# Patient Record
Sex: MALE | Race: WHITE | NOT HISPANIC OR LATINO | Employment: OTHER | URBAN - METROPOLITAN AREA
[De-identification: names, ages, dates, MRNs, and addresses within clinical notes are randomized per-mention and may not be internally consistent; named-entity substitution may affect disease eponyms.]

---

## 2017-06-26 ENCOUNTER — LAB (OUTPATIENT)
Dept: LAB | Facility: HOSPITAL | Age: 72
End: 2017-06-26
Attending: ORTHOPAEDIC SURGERY

## 2017-06-26 ENCOUNTER — TRANSCRIBE ORDERS (OUTPATIENT)
Dept: LAB | Facility: HOSPITAL | Age: 72
End: 2017-06-26

## 2017-06-26 ENCOUNTER — HOSPITAL ENCOUNTER (OUTPATIENT)
Dept: CARDIOLOGY | Facility: HOSPITAL | Age: 72
Discharge: HOME | End: 2017-06-26
Attending: ORTHOPAEDIC SURGERY | Admitting: ORTHOPAEDIC SURGERY

## 2017-06-26 DIAGNOSIS — M66.871 TIBIALIS POSTERIOR TENDON TEAR, NONTRAUMATIC, RIGHT: ICD-10-CM

## 2017-06-26 DIAGNOSIS — M66.871 TIBIALIS POSTERIOR TENDON TEAR, NONTRAUMATIC, RIGHT: Primary | ICD-10-CM

## 2017-06-26 LAB
ALBUMIN SERPL-MCNC: 4.4 G/DL (ref 3.5–5.2)
ALBUMIN/GLOB SERPL: 1.5 G/DL
ALP SERPL-CCNC: 35 U/L (ref 39–117)
ALT SERPL W P-5'-P-CCNC: 26 U/L (ref 1–41)
ANION GAP SERPL CALCULATED.3IONS-SCNC: 13.9 MMOL/L
AST SERPL-CCNC: 21 U/L (ref 1–40)
BASOPHILS # BLD AUTO: 0.02 10*3/MM3 (ref 0–0.2)
BASOPHILS NFR BLD AUTO: 0.2 % (ref 0–1.5)
BILIRUB SERPL-MCNC: 0.3 MG/DL (ref 0.1–1.2)
BUN BLD-MCNC: 37 MG/DL (ref 8–23)
BUN/CREAT SERPL: 21 (ref 7–25)
CALCIUM SPEC-SCNC: 9.8 MG/DL (ref 8.6–10.5)
CHLORIDE SERPL-SCNC: 104 MMOL/L (ref 98–107)
CO2 SERPL-SCNC: 23.1 MMOL/L (ref 22–29)
CREAT BLD-MCNC: 1.76 MG/DL (ref 0.76–1.27)
DEPRECATED RDW RBC AUTO: 45.2 FL (ref 37–54)
EOSINOPHIL # BLD AUTO: 0.27 10*3/MM3 (ref 0–0.7)
EOSINOPHIL NFR BLD AUTO: 3.2 % (ref 0.3–6.2)
ERYTHROCYTE [DISTWIDTH] IN BLOOD BY AUTOMATED COUNT: 13.2 % (ref 11.5–14.5)
GFR SERPL CREATININE-BSD FRML MDRD: 38 ML/MIN/1.73
GLOBULIN UR ELPH-MCNC: 2.9 GM/DL
GLUCOSE BLD-MCNC: 119 MG/DL (ref 65–99)
HCT VFR BLD AUTO: 45.6 % (ref 40.4–52.2)
HGB BLD-MCNC: 15.5 G/DL (ref 13.7–17.6)
IMM GRANULOCYTES # BLD: 0 10*3/MM3 (ref 0–0.03)
IMM GRANULOCYTES NFR BLD: 0 % (ref 0–0.5)
LYMPHOCYTES # BLD AUTO: 2.59 10*3/MM3 (ref 0.9–4.8)
LYMPHOCYTES NFR BLD AUTO: 31.1 % (ref 19.6–45.3)
MCH RBC QN AUTO: 31.8 PG (ref 27–32.7)
MCHC RBC AUTO-ENTMCNC: 34 G/DL (ref 32.6–36.4)
MCV RBC AUTO: 93.6 FL (ref 79.8–96.2)
MONOCYTES # BLD AUTO: 0.69 10*3/MM3 (ref 0.2–1.2)
MONOCYTES NFR BLD AUTO: 8.3 % (ref 5–12)
NEUTROPHILS # BLD AUTO: 4.77 10*3/MM3 (ref 1.9–8.1)
NEUTROPHILS NFR BLD AUTO: 57.2 % (ref 42.7–76)
PLATELET # BLD AUTO: 164 10*3/MM3 (ref 140–500)
PMV BLD AUTO: 11.7 FL (ref 6–12)
POTASSIUM BLD-SCNC: 4.5 MMOL/L (ref 3.5–5.2)
PROT SERPL-MCNC: 7.3 G/DL (ref 6–8.5)
RBC # BLD AUTO: 4.87 10*6/MM3 (ref 4.6–6)
SODIUM BLD-SCNC: 141 MMOL/L (ref 136–145)
WBC NRBC COR # BLD: 8.34 10*3/MM3 (ref 4.5–10.7)

## 2017-06-26 PROCEDURE — 36415 COLL VENOUS BLD VENIPUNCTURE: CPT

## 2017-06-26 PROCEDURE — 85025 COMPLETE CBC W/AUTO DIFF WBC: CPT

## 2017-06-26 PROCEDURE — 93010 ELECTROCARDIOGRAM REPORT: CPT | Performed by: INTERNAL MEDICINE

## 2017-06-26 PROCEDURE — 93005 ELECTROCARDIOGRAM TRACING: CPT | Performed by: ORTHOPAEDIC SURGERY

## 2017-06-26 PROCEDURE — 80053 COMPREHEN METABOLIC PANEL: CPT

## 2017-10-26 ENCOUNTER — APPOINTMENT (OUTPATIENT)
Dept: GENERAL RADIOLOGY | Facility: HOSPITAL | Age: 72
End: 2017-10-26

## 2017-10-26 ENCOUNTER — HOSPITAL ENCOUNTER (EMERGENCY)
Facility: HOSPITAL | Age: 72
Discharge: HOME | End: 2017-10-26
Attending: EMERGENCY MEDICINE | Admitting: EMERGENCY MEDICINE

## 2017-10-26 VITALS
HEART RATE: 72 BPM | HEIGHT: 73 IN | SYSTOLIC BLOOD PRESSURE: 119 MMHG | TEMPERATURE: 98 F | DIASTOLIC BLOOD PRESSURE: 88 MMHG | BODY MASS INDEX: 25.84 KG/M2 | WEIGHT: 195 LBS | OXYGEN SATURATION: 98 % | RESPIRATION RATE: 17 BRPM

## 2017-10-26 DIAGNOSIS — M54.30 ACUTE SCIATICA: Primary | ICD-10-CM

## 2017-10-26 LAB — GLUCOSE BLDC GLUCOMTR-MCNC: 163 MG/DL (ref 70–130)

## 2017-10-26 PROCEDURE — 82962 GLUCOSE BLOOD TEST: CPT

## 2017-10-26 PROCEDURE — 99283 EMERGENCY DEPT VISIT LOW MDM: CPT

## 2017-10-26 PROCEDURE — 72110 X-RAY EXAM L-2 SPINE 4/>VWS: CPT

## 2017-10-26 PROCEDURE — 73502 X-RAY EXAM HIP UNI 2-3 VIEWS: CPT

## 2017-10-26 RX ORDER — IBUPROFEN 600 MG/1
600 TABLET ORAL EVERY 8 HOURS PRN
Qty: 30 TABLET | Refills: 0 | Status: SHIPPED | OUTPATIENT
Start: 2017-10-26 | End: 2018-05-09

## 2017-10-26 RX ORDER — TRAMADOL HYDROCHLORIDE 50 MG/1
50 TABLET ORAL EVERY 6 HOURS PRN
Qty: 15 TABLET | Refills: 0 | Status: SHIPPED | OUTPATIENT
Start: 2017-10-26 | End: 2018-05-09

## 2017-10-26 RX ORDER — ROSUVASTATIN CALCIUM 5 MG/1
5 TABLET, COATED ORAL DAILY
COMMUNITY

## 2017-10-26 RX ORDER — CYCLOBENZAPRINE HCL 10 MG
10 TABLET ORAL 3 TIMES DAILY PRN
Qty: 21 TABLET | Refills: 0 | Status: SHIPPED | OUTPATIENT
Start: 2017-10-26 | End: 2018-05-09

## 2017-10-26 RX ORDER — LISINOPRIL 20 MG/1
20 TABLET ORAL DAILY
COMMUNITY
End: 2022-08-02

## 2017-10-26 RX ORDER — GLIPIZIDE 10 MG/1
10 TABLET ORAL
COMMUNITY

## 2017-10-26 NOTE — ED PROVIDER NOTES
" EMERGENCY DEPARTMENT ENCOUNTER    CHIEF COMPLAINT  Chief Complaint: LLE pain  History given by: Pt  History limited by: Nothing  Room Number: 32/32  PMD: Maria Luz Mcnally MD      HPI:  Pt is a 72 y.o. male who presents complaining of L leg pain, onset 8 days ago. He reports his pain starts in his L hip pain that radiates into the front of his L knee. He describes the pain as an \"ache.\" Pt states he was working around the house, and then sneezing when he felt a shooting pain from his back into his L leg. He states his pain is worse with walking, but is improved when bending over. He denies fever, chills, dysuria, frequency, numbness or tingling in his L leg. He reports a hx of back pain, diabetes, and high cholesterol but denies a hx of his current sx as well as smoking.     Duration:  8 days  Onset: sudden  Timing: constant  Location: LLE  Radiation: L hip into his L knee  Quality: \"ache\"  Intensity/Severity: moderate  Progression: unchanged  Associated Symptoms: none stated  Aggravating Factors: movement  Alleviating Factors: bending over  Previous Episodes: Pt reports a hx of back pain, but denies a hx of this type of pain  Treatment before arrival: None stated    PAST MEDICAL HISTORY  Active Ambulatory Problems     Diagnosis Date Noted   • No Active Ambulatory Problems     Resolved Ambulatory Problems     Diagnosis Date Noted   • No Resolved Ambulatory Problems     Past Medical History:   Diagnosis Date   • Cancer    • Diabetes mellitus    • Hyperlipidemia    • Hypertension        PAST SURGICAL HISTORY  Past Surgical History:   Procedure Laterality Date   • BLADDER SURGERY     • FOOT SURGERY      RIGHT FOOT       FAMILY HISTORY  History reviewed. No pertinent family history.    SOCIAL HISTORY  Social History     Social History   • Marital status:      Spouse name: N/A   • Number of children: N/A   • Years of education: N/A     Occupational History   • Not on file.     Social History Main Topics   • " Smoking status: Never Smoker   • Smokeless tobacco: Not on file   • Alcohol use No   • Drug use: No   • Sexual activity: Not on file     Other Topics Concern   • Not on file     Social History Narrative   • No narrative on file       ALLERGIES  Review of patient's allergies indicates no known allergies.    REVIEW OF SYSTEMS  Review of Systems   Constitutional: Negative for activity change, appetite change, chills and fever.   HENT: Negative for congestion and sore throat.    Eyes: Negative.    Respiratory: Negative for cough and shortness of breath.    Cardiovascular: Negative for chest pain and leg swelling.   Gastrointestinal: Negative for abdominal pain, diarrhea and vomiting.   Endocrine: Negative.    Genitourinary: Negative for decreased urine volume and dysuria.   Musculoskeletal: Positive for arthralgias (L hip into L knee). Negative for back pain and neck pain.   Skin: Negative for rash and wound.   Allergic/Immunologic: Negative.    Neurological: Negative for weakness, numbness and headaches.   Hematological: Negative.    Psychiatric/Behavioral: Negative.    All other systems reviewed and are negative.      PHYSICAL EXAM  ED Triage Vitals   Temp Heart Rate Resp BP SpO2   10/26/17 1048 10/26/17 1046 10/26/17 1046 10/26/17 1103 10/26/17 1046   97.8 °F (36.6 °C) 113 20 110/89 93 %      Temp src Heart Rate Source Patient Position BP Location FiO2 (%)   10/26/17 1048 -- -- -- --   Tympanic           Physical Exam   Constitutional: He is oriented to person, place, and time and well-developed, well-nourished, and in no distress.   HENT:   Head: Normocephalic and atraumatic.   Eyes: EOM are normal. Pupils are equal, round, and reactive to light.   Neck: Normal range of motion. Neck supple.   Cardiovascular: Normal rate, regular rhythm and normal heart sounds.    No murmur heard.  Pulmonary/Chest: Effort normal and breath sounds normal. No respiratory distress.   Abdominal: Soft. Bowel sounds are normal. There is no  tenderness. There is no rebound and no guarding.   Musculoskeletal: Normal range of motion. He exhibits no edema.        Left hip: He exhibits tenderness (lateral). He exhibits normal range of motion.        Left knee: He exhibits normal range of motion.        Thoracic back: He exhibits no tenderness.        Lumbar back: He exhibits no tenderness.        Left upper leg: He exhibits tenderness (anterior L thigh).   Neurological: He is alert and oriented to person, place, and time. He has normal sensation and normal strength. He has a normal Straight Leg Raise Test.   2+ patellar reflexes bilaterally  Neurovascularly intact   Skin: Skin is warm and dry.   Psychiatric: Mood and affect normal.   Nursing note and vitals reviewed.      LAB RESULTS  Lab Results (last 24 hours)     Procedure Component Value Units Date/Time    POC Glucose Fingerstick [[id number]]  (Abnormal) Collected:  10/26/17 1146    Specimen:  Blood Updated:  10/26/17 1147     Glucose 163 (H) mg/dL     Narrative:       Meter: [id number] : [id number] Luis Alfredo Tyler          I ordered the above labs and reviewed the results    RADIOLOGY  XR Hip With or Without Pelvis 2 - 3 View Left      There are moderate degenerative changes involving the right hip more  than left with joint space narrowing and subchondral degenerative cystic  change in the head of the right femur. There are minimal degenerative  changes at both sacroiliac joints.   XR Spine Lumbar 4+ View      There is severe disc space narrowing and spurring at L5-S1 and to a  lesser extent at L4-5. There is also moderate spurring of the posterior  facets in the mid and lower lumbar spine.        I ordered the above noted radiological studies. Interpreted by radiologist. Reviewed by me in PACS.       PROCEDURES  Procedures      PROGRESS AND CONSULTS  ED Course     1138 - XR L Hip, lumbar spine, and glucose level ordered for further evaluation.     1319 - Rechecked pt. Pt is resting comfortably.  Informed pt of his XRs which are negative acute and show degenerative changes. D/w pt the plan to discharge home with muscle relaxers and naproxen for a follow up with PCP. Instructed pt to RTER the for fever and incontinence. Pt understands and agrees with plan.      MEDICAL DECISION MAKING  Results were reviewed/discussed with the patient and they were also made aware of online access. Pt also made aware that some labs, such as cultures, will not be resulted during ER visit and follow up with PMD is necessary.     MDM  Number of Diagnoses or Management Options     Amount and/or Complexity of Data Reviewed  Clinical lab tests: ordered and reviewed (Glucose - 163)  Tests in the radiology section of CPT®: ordered and reviewed (XR Lumbar spine - severe disc space narrowing and spurring at L5-S1 and to a lesser extent at L4-5.  XR Hip L - degenerative changes)           DIAGNOSIS  Final diagnoses:   Acute left sided sciatica       DISPOSITION  DISCHARGE    Patient discharged in stable condition.    Reviewed implications of results, diagnosis, meds, responsibility to follow up, warning signs and symptoms of possible worsening, potential complications and reasons to return to ER.    Patient/Family voiced understanding of above instructions.    Discussed plan for discharge, as there is no emergent indication for admission.  Pt/family is agreeable and understands need for follow up and repeat testing.  Pt is aware that discharge does not mean that nothing is wrong but it indicates no emergency is present that requires admission and they must continue care with follow-up as given below or physician of their choice.     FOLLOW-UP  Maria Luz Mcnally MD  [id number] [facility] [address] [phone]    Schedule an appointment as soon as possible for a visit           Medication List      New Prescriptions          cyclobenzaprine 10 MG tablet   Commonly known as:  FLEXERIL   Take 1 tablet by mouth 3 (Three) Times a  Day As Needed for Muscle Spasms.       ibuprofen 600 MG tablet   Commonly known as:  ADVIL,MOTRIN   Take 1 tablet by mouth Every 8 (Eight) Hours As Needed for Mild Pain .       traMADol 50 MG tablet   Commonly known as:  ULTRAM   Take 1 tablet by mouth Every 6 (Six) Hours As Needed for Moderate Pain .               Latest Documented Vital Signs:  As of 8:38 PM  BP- 119/88 HR- 72 Temp- 98 °F (36.7 °C) (Tympanic) O2 sat- 98%    --  Documentation assistance provided by daniela Hassan for Dr. Hanson.  Information recorded by the scribe was done at my direction and has been verified and validated by me.          William Hassan  10/26/17 2478       Gal Hanson MD  10/26/17 2913

## 2017-10-26 NOTE — DISCHARGE INSTRUCTIONS
Use medications as needed for your back pain.  Please return to the ED if symptoms worsen with fever, increasing pain, urinating or pooping on yourself or weakness of your left leg.

## 2017-10-26 NOTE — ED NOTES
Pt states the only time the pain in the left hip gets any better is with laying down or sitting, but pt states he cannot sleep at night because of the pain. Pt stated the pain started last wed. And he thought it would get better on its own, but it has not improved      Tanya Jeter RN  10/26/17 1127       Tanya Jeter RN  10/26/17 1128

## 2018-02-13 ENCOUNTER — HOSPITAL ENCOUNTER (OUTPATIENT)
Dept: OTHER | Facility: HOSPITAL | Age: 73
Setting detail: SPECIMEN
Discharge: HOME | End: 2018-02-13
Attending: UROLOGY | Admitting: UROLOGY

## 2018-02-20 ENCOUNTER — TRANSCRIBE ORDERS (OUTPATIENT)
Dept: ADMINISTRATIVE | Facility: HOSPITAL | Age: 73
End: 2018-02-20

## 2018-02-20 DIAGNOSIS — C65.1 MALIGNANT NEOPLASM OF RIGHT RENAL PELVIS (HCC): Primary | ICD-10-CM

## 2018-02-23 ENCOUNTER — HOSPITAL ENCOUNTER (OUTPATIENT)
Dept: CT IMAGING | Facility: HOSPITAL | Age: 73
Discharge: HOME | End: 2018-02-23
Attending: UROLOGY

## 2018-02-23 ENCOUNTER — HOSPITAL ENCOUNTER (OUTPATIENT)
Dept: NUCLEAR MEDICINE | Facility: HOSPITAL | Age: 73
Discharge: HOME | End: 2018-02-23
Attending: UROLOGY

## 2018-02-23 DIAGNOSIS — C65.1 MALIGNANT NEOPLASM OF RIGHT RENAL PELVIS (HCC): ICD-10-CM

## 2018-02-23 LAB — CREAT BLDA-MCNC: 1.5 MG/DL (ref 0.6–1.3)

## 2018-02-23 PROCEDURE — 0 IOPAMIDOL 61 % SOLUTION: Performed by: UROLOGY

## 2018-02-23 PROCEDURE — 82565 ASSAY OF CREATININE: CPT

## 2018-02-23 PROCEDURE — 0 TECHNETIUM MEDRONATE KIT: Performed by: UROLOGY

## 2018-02-23 PROCEDURE — 78306 BONE IMAGING WHOLE BODY: CPT

## 2018-02-23 PROCEDURE — A9503 TC99M MEDRONATE: HCPCS | Performed by: UROLOGY

## 2018-02-23 PROCEDURE — 71260 CT THORAX DX C+: CPT

## 2018-02-23 RX ORDER — TC 99M MEDRONATE 20 MG/10ML
21.2 INJECTION, POWDER, LYOPHILIZED, FOR SOLUTION INTRAVENOUS
Status: COMPLETED | OUTPATIENT
Start: 2018-02-23 | End: 2018-02-23

## 2018-02-23 RX ADMIN — Medication 21.2 MILLICURIE: at 12:00

## 2018-02-23 RX ADMIN — IOPAMIDOL 75 ML: 612 INJECTION, SOLUTION INTRAVENOUS at 12:30

## 2018-03-12 ENCOUNTER — HOSPITAL ENCOUNTER (OUTPATIENT)
Dept: OTHER | Facility: HOSPITAL | Age: 73
Discharge: HOME | End: 2018-03-12
Attending: UROLOGY | Admitting: UROLOGY

## 2018-03-19 ENCOUNTER — HOSPITAL ENCOUNTER (OUTPATIENT)
Dept: OTHER | Facility: HOSPITAL | Age: 73
Setting detail: SPECIMEN
Discharge: HOME | End: 2018-03-19
Attending: UROLOGY | Admitting: UROLOGY

## 2018-03-20 ENCOUNTER — HOSPITAL ENCOUNTER (OUTPATIENT)
Dept: OTHER | Facility: HOSPITAL | Age: 73
Setting detail: SPECIMEN
Discharge: HOME | End: 2018-03-20
Attending: UROLOGY | Admitting: UROLOGY

## 2018-03-20 LAB
ANION GAP SERPL CALC-SCNC: 9.5 MMOL/L (ref 10–20)
BASOPHILS # BLD AUTO: 0 10*3/UL (ref 0–0.2)
BASOPHILS NFR BLD AUTO: 0 % (ref 0–2)
BUN SERPL-MCNC: 25 MG/DL (ref 8–20)
BUN/CREAT SERPL: 13.2 (ref 6.2–20.3)
CALCIUM SERPL-MCNC: 7.6 MG/DL (ref 8.9–10.3)
CHLORIDE SERPL-SCNC: 109 MMOL/L (ref 101–111)
CONV CO2: 22 MMOL/L (ref 22–32)
CREAT UR-MCNC: 1.9 MG/DL (ref 0.7–1.2)
DIFFERENTIAL METHOD BLD: (no result)
EOSINOPHIL # BLD AUTO: 0.1 10*3/UL (ref 0–0.3)
EOSINOPHIL # BLD AUTO: 1 % (ref 0–3)
ERYTHROCYTE [DISTWIDTH] IN BLOOD BY AUTOMATED COUNT: 13.8 % (ref 11.5–14.5)
GLUCOSE SERPL-MCNC: 184 MG/DL (ref 65–99)
HCT VFR BLD AUTO: 35.8 % (ref 40–54)
HGB BLD-MCNC: 12 G/DL (ref 14–18)
LYMPHOCYTES # BLD AUTO: 1.2 10*3/UL (ref 0.8–4.8)
LYMPHOCYTES NFR BLD AUTO: 17 % (ref 18–42)
MCH RBC QN AUTO: 31.4 PG (ref 26–32)
MCHC RBC AUTO-ENTMCNC: 33.5 G/DL (ref 32–36)
MCV RBC AUTO: 93.8 FL (ref 80–94)
MONOCYTES # BLD AUTO: 0.8 10*3/UL (ref 0.1–1.3)
MONOCYTES NFR BLD AUTO: 11 % (ref 2–11)
NEUTROPHILS # BLD AUTO: 5 10*3/UL (ref 2.3–8.6)
NEUTROPHILS NFR BLD AUTO: 71 % (ref 50–75)
NRBC BLD AUTO-RTO: 0 /100{WBCS}
NRBC/RBC NFR BLD MANUAL: 0 10*3/UL
PLATELET # BLD AUTO: 146 10*3/UL (ref 150–450)
PMV BLD AUTO: 9.5 FL (ref 7.4–10.4)
POTASSIUM SERPL-SCNC: 4.5 MMOL/L (ref 3.6–5.1)
RBC # BLD AUTO: 3.82 10*6/UL (ref 4.6–6)
SODIUM SERPL-SCNC: 136 MMOL/L (ref 136–144)
WBC # BLD AUTO: 7.1 10*3/UL (ref 4.5–11.5)

## 2018-03-21 ENCOUNTER — APPOINTMENT (OUTPATIENT)
Dept: GENERAL RADIOLOGY | Facility: HOSPITAL | Age: 73
End: 2018-03-21
Attending: UROLOGY

## 2018-03-21 ENCOUNTER — HOSPITAL ENCOUNTER (INPATIENT)
Facility: HOSPITAL | Age: 73
LOS: 3 days | Discharge: HOME | End: 2018-03-24
Attending: UROLOGY | Admitting: UROLOGY

## 2018-03-21 ENCOUNTER — APPOINTMENT (OUTPATIENT)
Dept: CARDIOLOGY | Facility: HOSPITAL | Age: 73
End: 2018-03-21
Attending: INTERNAL MEDICINE

## 2018-03-21 PROBLEM — I48.91 ATRIAL FIBRILLATION WITH RVR: Status: ACTIVE | Noted: 2018-03-21

## 2018-03-21 PROBLEM — N18.30 STAGE 3 CHRONIC KIDNEY DISEASE (HCC): Status: ACTIVE | Noted: 2018-03-21

## 2018-03-21 PROBLEM — N18.9 CKD (CHRONIC KIDNEY DISEASE): Status: ACTIVE | Noted: 2018-03-21

## 2018-03-21 PROBLEM — E11.649 TYPE 2 DIABETES MELLITUS WITH HYPOGLYCEMIA (HCC): Status: ACTIVE | Noted: 2018-03-21

## 2018-03-21 PROBLEM — I10 ESSENTIAL HYPERTENSION: Status: ACTIVE | Noted: 2018-03-21

## 2018-03-21 PROBLEM — C67.9 BLADDER CANCER (HCC): Status: ACTIVE | Noted: 2018-03-21

## 2018-03-21 PROBLEM — C66.9 URETER CANCER (HCC): Status: ACTIVE | Noted: 2018-03-21

## 2018-03-21 LAB
ALBUMIN SERPL-MCNC: 3 G/DL (ref 3.5–5.2)
ALBUMIN/GLOB SERPL: 1.5 G/DL
ALP SERPL-CCNC: 20 U/L (ref 39–117)
ALT SERPL W P-5'-P-CCNC: 12 U/L (ref 1–41)
ANION GAP SERPL CALCULATED.3IONS-SCNC: 9.2 MMOL/L
ASCENDING AORTA: 3.2 CM
AST SERPL-CCNC: 19 U/L (ref 1–40)
BASOPHILS # BLD AUTO: 0.01 10*3/MM3 (ref 0–0.2)
BASOPHILS NFR BLD AUTO: 0.1 % (ref 0–1.5)
BH CV ECHO MEAS - ACS: 1.9 CM
BH CV ECHO MEAS - AO MAX PG (FULL): 2.7 MMHG
BH CV ECHO MEAS - AO MAX PG: 8 MMHG
BH CV ECHO MEAS - AO MEAN PG (FULL): 2 MMHG
BH CV ECHO MEAS - AO MEAN PG: 4 MMHG
BH CV ECHO MEAS - AO ROOT AREA (BSA CORRECTED): 1.6
BH CV ECHO MEAS - AO ROOT AREA: 9.1 CM^2
BH CV ECHO MEAS - AO ROOT DIAM: 3.4 CM
BH CV ECHO MEAS - AO V2 MAX: 141 CM/SEC
BH CV ECHO MEAS - AO V2 MEAN: 92.8 CM/SEC
BH CV ECHO MEAS - AO V2 VTI: 22.3 CM
BH CV ECHO MEAS - ASC AORTA: 3.2 CM
BH CV ECHO MEAS - AVA(I,A): 2.8 CM^2
BH CV ECHO MEAS - AVA(I,D): 2.8 CM^2
BH CV ECHO MEAS - AVA(V,A): 2.8 CM^2
BH CV ECHO MEAS - AVA(V,D): 2.8 CM^2
BH CV ECHO MEAS - BSA(HAYCOCK): 2.1 M^2
BH CV ECHO MEAS - BSA: 2.1 M^2
BH CV ECHO MEAS - BZI_BMI: 25.7 KILOGRAMS/M^2
BH CV ECHO MEAS - BZI_METRIC_HEIGHT: 185.4 CM
BH CV ECHO MEAS - BZI_METRIC_WEIGHT: 88.5 KG
BH CV ECHO MEAS - CONTRAST EF (2CH): 51.2 ML/M^2
BH CV ECHO MEAS - CONTRAST EF 4CH: 54 ML/M^2
BH CV ECHO MEAS - EDV(CUBED): 103.8 ML
BH CV ECHO MEAS - EDV(MOD-SP2): 121 ML
BH CV ECHO MEAS - EDV(MOD-SP4): 124 ML
BH CV ECHO MEAS - EDV(TEICH): 102.4 ML
BH CV ECHO MEAS - EF(CUBED): 71.3 %
BH CV ECHO MEAS - EF(MOD-SP2): 51.2 %
BH CV ECHO MEAS - EF(MOD-SP4): 54 %
BH CV ECHO MEAS - EF(TEICH): 63 %
BH CV ECHO MEAS - ESV(CUBED): 29.8 ML
BH CV ECHO MEAS - ESV(MOD-SP2): 59 ML
BH CV ECHO MEAS - ESV(MOD-SP4): 57 ML
BH CV ECHO MEAS - ESV(TEICH): 37.9 ML
BH CV ECHO MEAS - FS: 34 %
BH CV ECHO MEAS - IVS/LVPW: 1
BH CV ECHO MEAS - IVSD: 1 CM
BH CV ECHO MEAS - LAT PEAK E' VEL: 13 CM/SEC
BH CV ECHO MEAS - LV DIASTOLIC VOL/BSA (35-75): 58.2 ML/M^2
BH CV ECHO MEAS - LV MASS(C)D: 164.5 GRAMS
BH CV ECHO MEAS - LV MASS(C)DI: 77.3 GRAMS/M^2
BH CV ECHO MEAS - LV MAX PG: 5.3 MMHG
BH CV ECHO MEAS - LV MEAN PG: 2 MMHG
BH CV ECHO MEAS - LV SYSTOLIC VOL/BSA (12-30): 26.8 ML/M^2
BH CV ECHO MEAS - LV V1 MAX: 115 CM/SEC
BH CV ECHO MEAS - LV V1 MEAN: 68.4 CM/SEC
BH CV ECHO MEAS - LV V1 VTI: 18 CM
BH CV ECHO MEAS - LVIDD: 4.7 CM
BH CV ECHO MEAS - LVIDS: 3.1 CM
BH CV ECHO MEAS - LVLD AP2: 9.1 CM
BH CV ECHO MEAS - LVLD AP4: 8.9 CM
BH CV ECHO MEAS - LVLS AP2: 7.7 CM
BH CV ECHO MEAS - LVLS AP4: 8 CM
BH CV ECHO MEAS - LVOT AREA (M): 3.5 CM^2
BH CV ECHO MEAS - LVOT AREA: 3.5 CM^2
BH CV ECHO MEAS - LVOT DIAM: 2.1 CM
BH CV ECHO MEAS - LVPWD: 1 CM
BH CV ECHO MEAS - MED PEAK E' VEL: 10 CM/SEC
BH CV ECHO MEAS - MR MAX PG: 28.5 MMHG
BH CV ECHO MEAS - MR MAX VEL: 267 CM/SEC
BH CV ECHO MEAS - MV DEC SLOPE: 597 CM/SEC^2
BH CV ECHO MEAS - MV DEC TIME: 0.18 SEC
BH CV ECHO MEAS - MV E MAX VEL: 104 CM/SEC
BH CV ECHO MEAS - MV MAX PG: 3 MMHG
BH CV ECHO MEAS - MV MEAN PG: 2 MMHG
BH CV ECHO MEAS - MV P1/2T MAX VEL: 95 CM/SEC
BH CV ECHO MEAS - MV P1/2T: 46.6 MSEC
BH CV ECHO MEAS - MV V2 MAX: 86.2 CM/SEC
BH CV ECHO MEAS - MV V2 MEAN: 56.8 CM/SEC
BH CV ECHO MEAS - MV V2 VTI: 14.1 CM
BH CV ECHO MEAS - MVA P1/2T LCG: 2.3 CM^2
BH CV ECHO MEAS - MVA(P1/2T): 4.7 CM^2
BH CV ECHO MEAS - MVA(VTI): 4.4 CM^2
BH CV ECHO MEAS - PA ACC TIME: 0.07 SEC
BH CV ECHO MEAS - PA MAX PG (FULL): 2.6 MMHG
BH CV ECHO MEAS - PA MAX PG: 4.2 MMHG
BH CV ECHO MEAS - PA PR(ACCEL): 49.8 MMHG
BH CV ECHO MEAS - PA V2 MAX: 103 CM/SEC
BH CV ECHO MEAS - PVA(V,A): 1.8 CM^2
BH CV ECHO MEAS - PVA(V,D): 1.8 CM^2
BH CV ECHO MEAS - QP/QS: 0.56
BH CV ECHO MEAS - RV MAX PG: 1.7 MMHG
BH CV ECHO MEAS - RV MEAN PG: 1 MMHG
BH CV ECHO MEAS - RV V1 MAX: 65 CM/SEC
BH CV ECHO MEAS - RV V1 MEAN: 40.3 CM/SEC
BH CV ECHO MEAS - RV V1 VTI: 12.3 CM
BH CV ECHO MEAS - RVOT AREA: 2.8 CM^2
BH CV ECHO MEAS - RVOT DIAM: 1.9 CM
BH CV ECHO MEAS - SI(AO): 95.1 ML/M^2
BH CV ECHO MEAS - SI(CUBED): 34.8 ML/M^2
BH CV ECHO MEAS - SI(LVOT): 29.3 ML/M^2
BH CV ECHO MEAS - SI(MOD-SP2): 29.1 ML/M^2
BH CV ECHO MEAS - SI(MOD-SP4): 31.5 ML/M^2
BH CV ECHO MEAS - SI(TEICH): 30.3 ML/M^2
BH CV ECHO MEAS - SV(AO): 202.5 ML
BH CV ECHO MEAS - SV(CUBED): 74 ML
BH CV ECHO MEAS - SV(LVOT): 62.3 ML
BH CV ECHO MEAS - SV(MOD-SP2): 62 ML
BH CV ECHO MEAS - SV(MOD-SP4): 67 ML
BH CV ECHO MEAS - SV(RVOT): 34.9 ML
BH CV ECHO MEAS - SV(TEICH): 64.4 ML
BH CV ECHO MEAS - TAPSE (>1.6): 1.4 CM2
BH CV ECHO MEAS - TR MAX VEL: 281 CM/SEC
BH CV VAS BP RIGHT ARM: NORMAL MMHG
BH CV XLRA - RV BASE: 2.8 CM
BH CV XLRA - TDI S': 13 CM/SEC
BILIRUB SERPL-MCNC: 0.3 MG/DL (ref 0.1–1.2)
BUN BLD-MCNC: 18 MG/DL (ref 8–23)
BUN/CREAT SERPL: 11.1 (ref 7–25)
CALCIUM SPEC-SCNC: 7.2 MG/DL (ref 8.6–10.5)
CHLORIDE SERPL-SCNC: 109 MMOL/L (ref 98–107)
CO2 SERPL-SCNC: 22.8 MMOL/L (ref 22–29)
CREAT BLD-MCNC: 1.62 MG/DL (ref 0.76–1.27)
DEPRECATED RDW RBC AUTO: 48.3 FL (ref 37–54)
E/E' RATIO: 9
EOSINOPHIL # BLD AUTO: 0.13 10*3/MM3 (ref 0–0.7)
EOSINOPHIL NFR BLD AUTO: 1.6 % (ref 0.3–6.2)
ERYTHROCYTE [DISTWIDTH] IN BLOOD BY AUTOMATED COUNT: 13.5 % (ref 11.5–14.5)
GFR SERPL CREATININE-BSD FRML MDRD: 42 ML/MIN/1.73
GLOBULIN UR ELPH-MCNC: 2 GM/DL
GLUCOSE BLD-MCNC: 78 MG/DL (ref 65–99)
GLUCOSE BLDC GLUCOMTR-MCNC: 101 MG/DL (ref 70–130)
GLUCOSE BLDC GLUCOMTR-MCNC: 104 MG/DL (ref 70–130)
GLUCOSE BLDC GLUCOMTR-MCNC: 64 MG/DL (ref 70–130)
GLUCOSE BLDC GLUCOMTR-MCNC: 99 MG/DL (ref 70–130)
HCT VFR BLD AUTO: 34.1 % (ref 40.4–52.2)
HGB BLD-MCNC: 10.9 G/DL (ref 13.7–17.6)
IMM GRANULOCYTES # BLD: 0.02 10*3/MM3 (ref 0–0.03)
IMM GRANULOCYTES NFR BLD: 0.2 % (ref 0–0.5)
LEFT ATRIUM VOLUME INDEX: 35 ML/M2
LV EF 2D ECHO EST: 54 %
LYMPHOCYTES # BLD AUTO: 1.46 10*3/MM3 (ref 0.9–4.8)
LYMPHOCYTES NFR BLD AUTO: 18 % (ref 19.6–45.3)
MAXIMAL PREDICTED HEART RATE: 147 BPM
MCH RBC QN AUTO: 31.3 PG (ref 27–32.7)
MCHC RBC AUTO-ENTMCNC: 32 G/DL (ref 32.6–36.4)
MCV RBC AUTO: 98 FL (ref 79.8–96.2)
MONOCYTES # BLD AUTO: 0.86 10*3/MM3 (ref 0.2–1.2)
MONOCYTES NFR BLD AUTO: 10.6 % (ref 5–12)
NEUTROPHILS # BLD AUTO: 5.61 10*3/MM3 (ref 1.9–8.1)
NEUTROPHILS NFR BLD AUTO: 69.5 % (ref 42.7–76)
PLATELET # BLD AUTO: 126 10*3/MM3 (ref 140–500)
PMV BLD AUTO: 10.5 FL (ref 6–12)
POTASSIUM BLD-SCNC: 4.5 MMOL/L (ref 3.5–5.2)
PROT SERPL-MCNC: 5 G/DL (ref 6–8.5)
RBC # BLD AUTO: 3.48 10*6/MM3 (ref 4.6–6)
SODIUM BLD-SCNC: 141 MMOL/L (ref 136–145)
STRESS TARGET HR: 125 BPM
TROPONIN T SERPL-MCNC: <0.01 NG/ML (ref 0–0.03)
WBC NRBC COR # BLD: 8.09 10*3/MM3 (ref 4.5–10.7)

## 2018-03-21 PROCEDURE — 80053 COMPREHEN METABOLIC PANEL: CPT | Performed by: UROLOGY

## 2018-03-21 PROCEDURE — 99232 SBSQ HOSP IP/OBS MODERATE 35: CPT | Performed by: INTERNAL MEDICINE

## 2018-03-21 PROCEDURE — 25010000002 ONDANSETRON PER 1 MG: Performed by: INTERNAL MEDICINE

## 2018-03-21 PROCEDURE — 71046 X-RAY EXAM CHEST 2 VIEWS: CPT

## 2018-03-21 PROCEDURE — 82962 GLUCOSE BLOOD TEST: CPT

## 2018-03-21 PROCEDURE — 85025 COMPLETE CBC W/AUTO DIFF WBC: CPT | Performed by: UROLOGY

## 2018-03-21 PROCEDURE — 83036 HEMOGLOBIN GLYCOSYLATED A1C: CPT | Performed by: INTERNAL MEDICINE

## 2018-03-21 PROCEDURE — 25010000003 CEFAZOLIN 1 GM/50ML SOLUTION: Performed by: UROLOGY

## 2018-03-21 PROCEDURE — 93306 TTE W/DOPPLER COMPLETE: CPT | Performed by: INTERNAL MEDICINE

## 2018-03-21 PROCEDURE — 25010000002 ONDANSETRON PER 1 MG: Performed by: UROLOGY

## 2018-03-21 PROCEDURE — 93306 TTE W/DOPPLER COMPLETE: CPT

## 2018-03-21 PROCEDURE — 93010 ELECTROCARDIOGRAM REPORT: CPT | Performed by: INTERNAL MEDICINE

## 2018-03-21 PROCEDURE — 63710000001 PROMETHAZINE PER 12.5 MG: Performed by: INTERNAL MEDICINE

## 2018-03-21 PROCEDURE — 93005 ELECTROCARDIOGRAM TRACING: CPT | Performed by: UROLOGY

## 2018-03-21 PROCEDURE — 84484 ASSAY OF TROPONIN QUANT: CPT | Performed by: UROLOGY

## 2018-03-21 PROCEDURE — 25010000002 MORPHINE SULFATE (PF) 2 MG/ML SOLUTION: Performed by: UROLOGY

## 2018-03-21 RX ORDER — NICOTINE POLACRILEX 4 MG
15 LOZENGE BUCCAL
Status: DISCONTINUED | OUTPATIENT
Start: 2018-03-21 | End: 2018-03-24 | Stop reason: HOSPADM

## 2018-03-21 RX ORDER — ONDANSETRON 2 MG/ML
4 INJECTION INTRAMUSCULAR; INTRAVENOUS EVERY 6 HOURS PRN
Status: DISCONTINUED | OUTPATIENT
Start: 2018-03-21 | End: 2018-03-21

## 2018-03-21 RX ORDER — ROSUVASTATIN CALCIUM 5 MG/1
5 TABLET, COATED ORAL NIGHTLY
Status: DISCONTINUED | OUTPATIENT
Start: 2018-03-21 | End: 2018-03-24 | Stop reason: HOSPADM

## 2018-03-21 RX ORDER — PANTOPRAZOLE SODIUM 40 MG/1
40 TABLET, DELAYED RELEASE ORAL
Status: DISCONTINUED | OUTPATIENT
Start: 2018-03-21 | End: 2018-03-24 | Stop reason: HOSPADM

## 2018-03-21 RX ORDER — DIPHENHYDRAMINE HCL 25 MG
25 CAPSULE ORAL EVERY 6 HOURS PRN
Status: DISCONTINUED | OUTPATIENT
Start: 2018-03-21 | End: 2018-03-24 | Stop reason: HOSPADM

## 2018-03-21 RX ORDER — GUAIFENESIN/DEXTROMETHORPHAN 100-10MG/5
10 SYRUP ORAL EVERY 4 HOURS PRN
Status: DISCONTINUED | OUTPATIENT
Start: 2018-03-21 | End: 2018-03-24 | Stop reason: HOSPADM

## 2018-03-21 RX ORDER — PROMETHAZINE HYDROCHLORIDE 12.5 MG/1
12.5 TABLET ORAL EVERY 6 HOURS PRN
Status: DISCONTINUED | OUTPATIENT
Start: 2018-03-21 | End: 2018-03-24 | Stop reason: HOSPADM

## 2018-03-21 RX ORDER — ONDANSETRON 2 MG/ML
4 INJECTION INTRAMUSCULAR; INTRAVENOUS EVERY 4 HOURS PRN
Status: DISCONTINUED | OUTPATIENT
Start: 2018-03-21 | End: 2018-03-24 | Stop reason: HOSPADM

## 2018-03-21 RX ORDER — HYDROCODONE BITARTRATE AND ACETAMINOPHEN 10; 325 MG/1; MG/1
1 TABLET ORAL EVERY 6 HOURS PRN
Status: DISCONTINUED | OUTPATIENT
Start: 2018-03-21 | End: 2018-03-24 | Stop reason: HOSPADM

## 2018-03-21 RX ORDER — ZOLPIDEM TARTRATE 5 MG/1
10 TABLET ORAL NIGHTLY PRN
Status: DISCONTINUED | OUTPATIENT
Start: 2018-03-21 | End: 2018-03-24 | Stop reason: HOSPADM

## 2018-03-21 RX ORDER — SODIUM CHLORIDE 9 MG/ML
100 INJECTION, SOLUTION INTRAVENOUS CONTINUOUS
Status: DISCONTINUED | OUTPATIENT
Start: 2018-03-21 | End: 2018-03-24 | Stop reason: HOSPADM

## 2018-03-21 RX ORDER — MORPHINE SULFATE 2 MG/ML
2 INJECTION, SOLUTION INTRAMUSCULAR; INTRAVENOUS
Status: DISCONTINUED | OUTPATIENT
Start: 2018-03-21 | End: 2018-03-24 | Stop reason: HOSPADM

## 2018-03-21 RX ORDER — PANTOPRAZOLE SODIUM 20 MG/1
20 TABLET, DELAYED RELEASE ORAL 2 TIMES DAILY
Status: DISCONTINUED | OUTPATIENT
Start: 2018-03-21 | End: 2018-03-21 | Stop reason: CLARIF

## 2018-03-21 RX ORDER — ACETAMINOPHEN 325 MG/1
650 TABLET ORAL EVERY 6 HOURS PRN
Status: DISCONTINUED | OUTPATIENT
Start: 2018-03-21 | End: 2018-03-24 | Stop reason: HOSPADM

## 2018-03-21 RX ORDER — BISACODYL 10 MG
10 SUPPOSITORY, RECTAL RECTAL DAILY PRN
Status: DISCONTINUED | OUTPATIENT
Start: 2018-03-21 | End: 2018-03-24 | Stop reason: HOSPADM

## 2018-03-21 RX ORDER — DEXTROSE MONOHYDRATE 25 G/50ML
25 INJECTION, SOLUTION INTRAVENOUS
Status: DISCONTINUED | OUTPATIENT
Start: 2018-03-21 | End: 2018-03-24 | Stop reason: HOSPADM

## 2018-03-21 RX ADMIN — DEXTROSE MONOHYDRATE 25 G: 25 INJECTION, SOLUTION INTRAVENOUS at 11:46

## 2018-03-21 RX ADMIN — ROSUVASTATIN CALCIUM 5 MG: 5 TABLET, FILM COATED ORAL at 21:15

## 2018-03-21 RX ADMIN — METOPROLOL TARTRATE 12.5 MG: 25 TABLET ORAL at 11:46

## 2018-03-21 RX ADMIN — METOPROLOL TARTRATE 12.5 MG: 25 TABLET ORAL at 21:14

## 2018-03-21 RX ADMIN — SODIUM CHLORIDE 100 ML/HR: 9 INJECTION, SOLUTION INTRAVENOUS at 20:21

## 2018-03-21 RX ADMIN — ONDANSETRON 4 MG: 2 INJECTION INTRAMUSCULAR; INTRAVENOUS at 21:22

## 2018-03-21 RX ADMIN — HYDROCODONE BITARTRATE AND ACETAMINOPHEN 1 TABLET: 10; 325 TABLET ORAL at 15:09

## 2018-03-21 RX ADMIN — CEFAZOLIN SODIUM 1 G: 1 INJECTION, SOLUTION INTRAVENOUS at 11:58

## 2018-03-21 RX ADMIN — PANTOPRAZOLE SODIUM 40 MG: 40 TABLET, DELAYED RELEASE ORAL at 15:09

## 2018-03-21 RX ADMIN — PROMETHAZINE HYDROCHLORIDE 12.5 MG: 12.5 TABLET ORAL at 18:02

## 2018-03-21 RX ADMIN — MORPHINE SULFATE 2 MG: 2 INJECTION, SOLUTION INTRAMUSCULAR; INTRAVENOUS at 12:00

## 2018-03-21 RX ADMIN — ONDANSETRON 4 MG: 2 INJECTION INTRAMUSCULAR; INTRAVENOUS at 15:10

## 2018-03-21 RX ADMIN — CEFAZOLIN SODIUM 1 G: 1 INJECTION, SOLUTION INTRAVENOUS at 17:07

## 2018-03-21 RX ADMIN — SODIUM CHLORIDE 100 ML/HR: 9 INJECTION, SOLUTION INTRAVENOUS at 10:01

## 2018-03-21 RX ADMIN — MAGNESIUM HYDROXIDE 15 ML: 2400 SUSPENSION ORAL at 11:57

## 2018-03-21 NOTE — CONSULTS
Name: Paul English ADMIT: 3/21/2018   : 1945  PCP: Maria Luz Mcnally MD    MRN: [id number] LOS: 0 days   AGE/SEX: 72 y.o. male  ROOM: 2/     No chief complaint on file.  Consult: DM c hypoglycemia/HTN    Subjective   Mr. English is a 72 y.o. male admitted to Dr Boyce with AFib and RVR and hypotension after right laparoscopic nephroureterectomy at [facility]. He reports no recent changes in medication and denies CP palpitations and SOA. No dizziness, lightheadedness. His rate is currently ranging from 110s to 130s and Cardiology has evaluated. His DM has been well controlled at home although he is currently hypoglycemic with BG in 60s. Nursing is present and about to give dextrose. He reports BG has been mostly in 120-130s at home. No recent medication changes. He does report abdominal pain located around incision sites in RLQ and suprapubic area. No NVD reported.    History of Present Illness    Past Medical History:   Diagnosis Date   • Cancer     BLADDER   • Diabetes mellitus    • Hyperlipidemia    • Hypertension      Past Surgical History:   Procedure Laterality Date   • BLADDER SURGERY     • FOOT SURGERY      RIGHT FOOT     No family history on file.  Social History   Substance Use Topics   • Smoking status: Never Smoker   • Smokeless tobacco: Not on file   • Alcohol use No     Prescriptions Prior to Admission   Medication Sig Dispense Refill Last Dose   • cyclobenzaprine (FLEXERIL) 10 MG tablet Take 1 tablet by mouth 3 (Three) Times a Day As Needed for Muscle Spasms. 21 tablet 0    • glipiZIDE (GLUCOTROL) 10 MG tablet Take 10 mg by mouth 2 (Two) Times a Day Before Meals.      • ibuprofen (ADVIL,MOTRIN) 600 MG tablet Take 1 tablet by mouth Every 8 (Eight) Hours As Needed for Mild Pain . 30 tablet 0    • lisinopril (PRINIVIL,ZESTRIL) 20 MG tablet Take 20 mg by mouth Daily.      • rosuvastatin (CRESTOR) 5 MG tablet Take 5 mg by mouth Daily.      • traMADol (ULTRAM) 50 MG tablet Take 1 tablet  by mouth Every 6 (Six) Hours As Needed for Moderate Pain . 15 tablet 0      Allergies:  No Known Allergies    Review of Systems   Constitutional: Negative for chills and fever.   HENT: Negative for sore throat and trouble swallowing.    Eyes: Negative for pain and visual disturbance.   Respiratory: Negative for cough and shortness of breath.    Cardiovascular: Negative for chest pain and palpitations.   Gastrointestinal: Positive for abdominal pain and nausea. Negative for constipation, diarrhea and vomiting.   Endocrine: Negative for cold intolerance and heat intolerance.   Genitourinary: Negative for difficulty urinating and dysuria.   Musculoskeletal: Negative for neck pain and neck stiffness.   Skin: Negative for pallor and rash.   Allergic/Immunologic: Negative for environmental allergies and food allergies.   Neurological: Negative for seizures and syncope.   Hematological: Negative for adenopathy.   Psychiatric/Behavioral: Negative for agitation and confusion.        Objective    Vital Signs  Temp:  [98.9 °F (37.2 °C)] 98.9 °F (37.2 °C)  Heart Rate:  [130] 130  Resp:  [20] 20  BP: (92)/(61) 92/61  SpO2:  [90 %] 90 %  on   ;      There is no height or weight on file to calculate BMI.    Physical Exam   Constitutional: He is oriented to person, place, and time. He appears well-developed. No distress.   HENT:   Head: Normocephalic and atraumatic.   Nose: Nose normal.   Eyes: EOM are normal. Pupils are equal, round, and reactive to light.   Neck: Normal range of motion. Neck supple.   Cardiovascular: Intact distal pulses.  An irregularly irregular rhythm present. Tachycardia present.    Pulmonary/Chest: Effort normal and breath sounds normal. He has no wheezes.   Abdominal: Soft. He exhibits distension. There is tenderness (RLQ/suprapubic). There is guarding. There is no rebound.   R LEONIDES drain in place and dressed   Musculoskeletal: Normal range of motion. He exhibits no edema.   Neurological: He is alert and  oriented to person, place, and time.   Skin: Skin is warm and dry. He is not diaphoretic.   Psychiatric: He has a normal mood and affect. His behavior is normal.   Nursing note and vitals reviewed.      Results Review:   I reviewed the patient's new clinical results including all labs. Reviewed telemetry and EKG, Afib, rate currently 130s, no acute st changes. Reviewed prior records and imaging.    Lab Results (last 24 hours)     Procedure Component Value Units Date/Time    Troponin [[id number]]  (Normal) Collected:  03/21/18 0925    Specimen:  Blood Updated:  03/21/18 1003     Troponin T <0.010 ng/mL     Narrative:       Troponin T Reference Ranges:  Less than 0.03 ng/mL:    Negative for AMI  0.03 to 0.09 ng/mL:      Indeterminant for AMI  Greater than 0.09 ng/mL: Positive for AMI    CBC & Differential [[id number]] Collected:  03/21/18 0925    Specimen:  Blood Updated:  03/21/18 0942    Narrative:       The following orders were created for panel order CBC & Differential.  Procedure                               Abnormality         Status                     ---------                               -----------         ------                     CBC Auto Differential[[id number]]        Abnormal            Final result                 Please view results for these tests on the individual orders.    Comprehensive Metabolic Panel [[id number]]  (Abnormal) Collected:  03/21/18 0925    Specimen:  Blood Updated:  03/21/18 1002     Glucose 78 mg/dL      BUN 18 mg/dL      Creatinine 1.62 (H) mg/dL      Sodium 141 mmol/L      Potassium 4.5 mmol/L      Chloride 109 (H) mmol/L      CO2 22.8 mmol/L      Calcium 7.2 (L) mg/dL      Total Protein 5.0 (L) g/dL      Albumin 3.00 (L) g/dL      ALT (SGPT) 12 U/L      AST (SGOT) 19 U/L      Alkaline Phosphatase 20 (L) U/L      Total Bilirubin 0.3 mg/dL      eGFR Non African Amer 42 (L) mL/min/1.73      Globulin 2.0 gm/dL      A/G Ratio 1.5 g/dL      BUN/Creatinine Ratio 11.1     Anion Gap  9.2 mmol/L     Narrative:       The MDRD GFR formula is only valid for adults with stable renal function between ages 18 and 70.    CBC Auto Differential [[id number]]  (Abnormal) Collected:  03/21/18 0925    Specimen:  Blood Updated:  03/21/18 0942     WBC 8.09 10*3/mm3      RBC 3.48 (L) 10*6/mm3      Hemoglobin 10.9 (L) g/dL      Hematocrit 34.1 (L) %      MCV 98.0 (H) fL      MCH 31.3 pg      MCHC 32.0 (L) g/dL      RDW 13.5 %      RDW-SD 48.3 fl      MPV 10.5 fL      Platelets 126 (L) 10*3/mm3      Neutrophil % 69.5 %      Lymphocyte % 18.0 (L) %      Monocyte % 10.6 %      Eosinophil % 1.6 %      Basophil % 0.1 %      Immature Grans % 0.2 %      Neutrophils, Absolute 5.61 10*3/mm3      Lymphocytes, Absolute 1.46 10*3/mm3      Monocytes, Absolute 0.86 10*3/mm3      Eosinophils, Absolute 0.13 10*3/mm3      Basophils, Absolute 0.01 10*3/mm3      Immature Grans, Absolute 0.02 10*3/mm3     POC Glucose Once [[id number]]  (Abnormal) Collected:  03/21/18 1116    Specimen:  Blood Updated:  03/21/18 1118     Glucose 64 (L) mg/dL     Narrative:       Meter: [id number] : [id number] [facility] NA          XR Chest PA & Lateral    (Results Pending)     Assessment/Plan      Active Hospital Problems (** Indicates Principal Problem)    Diagnosis Date Noted   • **Atrial fibrillation with RVR [I48.91] 03/21/2018   • Type 2 diabetes mellitus with hypoglycemia [E11.649] 03/21/2018   • Essential hypertension [I10] 03/21/2018   • Bladder cancer [C67.9] 03/21/2018      Resolved Hospital Problems    Diagnosis Date Noted Date Resolved   No resolved problems to display.     - DM2: Check A1c. Hold glipizide. Hypoglycemia protocol as needed. Low dose SSI PRN.  - HTN: Hold lisinopril.  - CKD3: Monitor. Cr near baseline ~1.5.  - Bladder Cancer: Per primary service.  - AFib RVR: Metoprolol. Echo and CXR ordered. Cardiology following.    Thank you for the consult and allowing me to participate in Mr English's care. [facility] will continue  to follow. Please call with any questions or concerns.    I discussed the patients findings and my recommendations with patient, family and nursing staff.      Abel Aguayo MD  [facility]  03/21/18  11:43 AM

## 2018-03-21 NOTE — CONSULTS
Patient Name: Paul English  Age/Sex: 72 y.o. male  : 1945  MRN: [id number]    Date of Admission: 3/21/2018  Date of Encounter Visit: 18  Encounter Provider: Wes Stone MD  Place of Service: [facility] CARDIOLOGY      Referring Provider: Victor Manuel Boyce MD  Patient Care Team:  Maria Luz Mcnally MD as PCP - General (Internal Medicine)    Subjective:     Consulted for: Atrial fibrillation     Chief Complaint: Rapid HR     [id number] Score: 2     History of Present Illness:  Paul English is a 72 y.o. male patient. He has a history of HTN, hyperlipidemia and malignant neoplasm of right renal pelvis.     He presented to [facility] for right laparoscopic nephroureterectomy. Status post he presented in new onset atrial fibrillation with rate of 116 and BP 90/50. Labs included Cr 1.7 and BUN 19. He was transferred to [facility] for further evaluation.     We were consulted for new onset atrial fibrillation. He currently remains in a fib with rate of 130 and BP 92/61. He reports that he is asymptomatic. He is now receiving metoprolol tartrate. He is not being anticoagulated. ECHO has been ordered.       Past Medical History:  Past Medical History:   Diagnosis Date   • Cancer     BLADDER   • Diabetes mellitus    • Hyperlipidemia    • Hypertension        Past Surgical History:   Procedure Laterality Date   • BLADDER SURGERY     • FOOT SURGERY      RIGHT FOOT       Home Medications:   Prescriptions Prior to Admission   Medication Sig Dispense Refill Last Dose   • cyclobenzaprine (FLEXERIL) 10 MG tablet Take 1 tablet by mouth 3 (Three) Times a Day As Needed for Muscle Spasms. 21 tablet 0    • glipiZIDE (GLUCOTROL) 10 MG tablet Take 10 mg by mouth 2 (Two) Times a Day Before Meals.      • ibuprofen (ADVIL,MOTRIN) 600 MG tablet Take 1 tablet by mouth Every 8 (Eight) Hours As Needed for Mild Pain . 30 tablet 0    •  lisinopril (PRINIVIL,ZESTRIL) 20 MG tablet Take 20 mg by mouth Daily.      • rosuvastatin (CRESTOR) 5 MG tablet Take 5 mg by mouth Daily.      • traMADol (ULTRAM) 50 MG tablet Take 1 tablet by mouth Every 6 (Six) Hours As Needed for Moderate Pain . 15 tablet 0        Allergies:  No Known Allergies    Past Social History:  Social History     Social History   • Marital status:      Spouse name: N/A   • Number of children: N/A   • Years of education: N/A     Occupational History   • Not on file.     Social History Main Topics   • Smoking status: Never Smoker   • Smokeless tobacco: Not on file   • Alcohol use No   • Drug use: No   • Sexual activity: Not on file     Other Topics Concern   • Not on file     Social History Narrative   • No narrative on file        Past Family History:  No family history on file.      Review of Systems:  All systems reviewed. Pertinent positives identified in HPI. All other systems are negative.   REVIEW OF SYSTEMS:   CONSTITUTIONAL: No weight loss, fever, chills, weakness or fatigue.   HEENT: Eyes: No visual loss, blurred vision, double vision or yellow sclerae. Ears, Nose, Throat: No hearing loss, sneezing, congestion, runny nose or sore throat.   SKIN: No rash or itching.     RESPIRATORY: No shortness of breath, hemoptysis, cough or sputum.   GASTROINTESTINAL: No anorexia, nausea, vomiting or diarrhea. No abdominal pain, bright red blood per rectum or melena.  GENITOURINARY: No burning on urination, hematuria or increased frequency.  NEUROLOGICAL: No headache, dizziness, syncope, paralysis, ataxia, numbness or tingling in the extremities. No change in bowel or bladder control.   MUSCULOSKELETAL: No muscle, back pain, joint pain or stiffness.   HEMATOLOGIC: No anemia, bleeding or bruising.   LYMPHATICS: No enlarged nodes. No history of splenectomy.   PSYCHIATRIC: No history of depression, anxiety, hallucinations.   ENDOCRINOLOGIC: No reports of sweating, cold or heat intolerance.  No polyuria or polydipsia.       Objective:     Objective:  Temp:  [98.9 °F (37.2 °C)] 98.9 °F (37.2 °C)  Heart Rate:  [130] 130  Resp:  [20] 20  BP: (92)/(61) 92/61    Intake/Output Summary (Last 24 hours) at 03/21/18 1029  Last data filed at 03/21/18 1001   Gross per 24 hour   Intake             1000 ml   Output                0 ml   Net             1000 ml     There is no height or weight on file to calculate BMI.  There were no vitals filed for this visit.        Physical Exam:   General Appearance Well developed, cooperative and well nourished and no acute distress   Head Normocephalic, without abnormality, atraumatic   Ears Ears appear intact with no abnormalities noted   Throat No oral lesions, no thrush, oral mucosa moist   Neck No adenopathy, supple, trachea midline, no thyromegaly, no carotid bruit, no JVD   Back No skin lesions, erythema or scars, no tenderness to percussion or palptaion,range of motion is normal   Lungs Clear to auscultation,respirations regular, even and unlabored   Heart Rapid irregular rhythm   Chest wall No abnormalities observed   Abdomen Incision healing   Extremities Moves all extremities well, no edema, no cyanosis, no redness   Pulses Pulses palpable and equal bilaterally. Normal radial, carotid, femoral, dorsalis pedis and posterior tibial pulses bilaterally. Normal abdominal aorta   Skin No bleeding, bruising or rash   Psyhiatric Alert and oriented x 3, normal mood and affect       Lab Review:       Results from last 7 days  Lab Units 03/21/18  0925   SODIUM mmol/L 141   POTASSIUM mmol/L 4.5   CHLORIDE mmol/L 109*   CO2 mmol/L 22.8   BUN mg/dL 18   CREATININE mg/dL 1.62*   GLUCOSE mg/dL 78   CALCIUM mg/dL 7.2*         Results from last 7 days  Lab Units 03/21/18  0925   TROPONIN T ng/mL <0.010       Results from last 7 days  Lab Units 03/21/18  0925   WBC 10*3/mm3 8.09   HEMOGLOBIN g/dL 10.9*   HEMATOCRIT % 34.1*   PLATELETS 10*3/mm3 126*                                    Imaging:    Imaging Results (most recent)     None        TELE 3/21/2018 9:36 a.m.         EKG:         BASELINE 6/26/2017:       I personally viewed and interpreted the patient's EKG/Telemetry data.    Assessment:   Assessment/Plan   1. Atrial fibrillation: New onset.  I will start the patient on metoprolol tartrate and obtain an echocardiogram.  Because of his recent surgery withhold anticoagulation unless approved by Dr. Boyce.  I explained to the patient and his wife atrial fibrillation and its implications and its treatment.            Thank you for allowing me to participate in the care of Paul English. Feel free to contact me directly with any further questions or concerns.    Wes Stone MD  [facility]  03/21/18  10:29 AM

## 2018-03-21 NOTE — NURSING NOTE
Picked up pt at 1700. C/o nausea exacerbated by movement.  Phenergan ordered and given w/ relief.  HR 110s-150s. Afib. Pt asymptomatic. BP stable. Family at bedside.

## 2018-03-21 NOTE — PLAN OF CARE
Problem: Fall Risk (Adult)  Goal: Identify Related Risk Factors and Signs and Symptoms   03/21/18 1640   Fall Risk (Adult)   Related Risk Factors (Fall Risk) bladder function altered;gait/mobility problems;objects hard to reach;environment unfamiliar   Signs and Symptoms (Fall Risk) presence of risk factors

## 2018-03-22 PROBLEM — D69.6 THROMBOCYTOPENIA (HCC): Status: ACTIVE | Noted: 2018-03-22

## 2018-03-22 LAB
ANION GAP SERPL CALCULATED.3IONS-SCNC: 12 MMOL/L
BASOPHILS # BLD AUTO: 0.01 10*3/MM3 (ref 0–0.2)
BASOPHILS NFR BLD AUTO: 0.1 % (ref 0–1.5)
BUN BLD-MCNC: 16 MG/DL (ref 8–23)
BUN/CREAT SERPL: 12.6 (ref 7–25)
CALCIUM SPEC-SCNC: 7.7 MG/DL (ref 8.6–10.5)
CHLORIDE SERPL-SCNC: 106 MMOL/L (ref 98–107)
CO2 SERPL-SCNC: 22 MMOL/L (ref 22–29)
CREAT BLD-MCNC: 1.27 MG/DL (ref 0.76–1.27)
DEPRECATED RDW RBC AUTO: 47.8 FL (ref 37–54)
EOSINOPHIL # BLD AUTO: 0.14 10*3/MM3 (ref 0–0.7)
EOSINOPHIL NFR BLD AUTO: 1.5 % (ref 0.3–6.2)
ERYTHROCYTE [DISTWIDTH] IN BLOOD BY AUTOMATED COUNT: 13.3 % (ref 11.5–14.5)
GFR SERPL CREATININE-BSD FRML MDRD: 56 ML/MIN/1.73
GLUCOSE BLD-MCNC: 104 MG/DL (ref 65–99)
GLUCOSE BLDC GLUCOMTR-MCNC: 100 MG/DL (ref 70–130)
GLUCOSE BLDC GLUCOMTR-MCNC: 101 MG/DL (ref 70–130)
GLUCOSE BLDC GLUCOMTR-MCNC: 102 MG/DL (ref 70–130)
GLUCOSE BLDC GLUCOMTR-MCNC: 104 MG/DL (ref 70–130)
HBA1C MFR BLD: 5.9 % (ref 4.8–5.6)
HCT VFR BLD AUTO: 33.8 % (ref 40.4–52.2)
HGB BLD-MCNC: 10.8 G/DL (ref 13.7–17.6)
IMM GRANULOCYTES # BLD: 0.02 10*3/MM3 (ref 0–0.03)
IMM GRANULOCYTES NFR BLD: 0.2 % (ref 0–0.5)
LYMPHOCYTES # BLD AUTO: 1.18 10*3/MM3 (ref 0.9–4.8)
LYMPHOCYTES NFR BLD AUTO: 12.9 % (ref 19.6–45.3)
MAGNESIUM SERPL-MCNC: 2.3 MG/DL (ref 1.6–2.4)
MCH RBC QN AUTO: 31.3 PG (ref 27–32.7)
MCHC RBC AUTO-ENTMCNC: 32 G/DL (ref 32.6–36.4)
MCV RBC AUTO: 98 FL (ref 79.8–96.2)
MONOCYTES # BLD AUTO: 0.93 10*3/MM3 (ref 0.2–1.2)
MONOCYTES NFR BLD AUTO: 10.2 % (ref 5–12)
NEUTROPHILS # BLD AUTO: 6.86 10*3/MM3 (ref 1.9–8.1)
NEUTROPHILS NFR BLD AUTO: 75.1 % (ref 42.7–76)
PLATELET # BLD AUTO: 131 10*3/MM3 (ref 140–500)
PMV BLD AUTO: 11.3 FL (ref 6–12)
POTASSIUM BLD-SCNC: 4.4 MMOL/L (ref 3.5–5.2)
RBC # BLD AUTO: 3.45 10*6/MM3 (ref 4.6–6)
SODIUM BLD-SCNC: 140 MMOL/L (ref 136–145)
TSH SERPL DL<=0.05 MIU/L-ACNC: 2.19 MIU/ML (ref 0.27–4.2)
WBC NRBC COR # BLD: 9.14 10*3/MM3 (ref 4.5–10.7)

## 2018-03-22 PROCEDURE — 93010 ELECTROCARDIOGRAM REPORT: CPT | Performed by: INTERNAL MEDICINE

## 2018-03-22 PROCEDURE — 99232 SBSQ HOSP IP/OBS MODERATE 35: CPT | Performed by: INTERNAL MEDICINE

## 2018-03-22 PROCEDURE — 82962 GLUCOSE BLOOD TEST: CPT

## 2018-03-22 PROCEDURE — 25010000002 ONDANSETRON PER 1 MG: Performed by: INTERNAL MEDICINE

## 2018-03-22 PROCEDURE — 84443 ASSAY THYROID STIM HORMONE: CPT | Performed by: INTERNAL MEDICINE

## 2018-03-22 PROCEDURE — 25010000003 CEFAZOLIN 1 GM/50ML SOLUTION: Performed by: UROLOGY

## 2018-03-22 PROCEDURE — 83735 ASSAY OF MAGNESIUM: CPT | Performed by: INTERNAL MEDICINE

## 2018-03-22 PROCEDURE — 80048 BASIC METABOLIC PNL TOTAL CA: CPT | Performed by: INTERNAL MEDICINE

## 2018-03-22 PROCEDURE — 85025 COMPLETE CBC W/AUTO DIFF WBC: CPT | Performed by: INTERNAL MEDICINE

## 2018-03-22 PROCEDURE — 93005 ELECTROCARDIOGRAM TRACING: CPT | Performed by: INTERNAL MEDICINE

## 2018-03-22 RX ORDER — BISACODYL 10 MG
10 SUPPOSITORY, RECTAL RECTAL 2 TIMES DAILY
Status: DISCONTINUED | OUTPATIENT
Start: 2018-03-22 | End: 2018-03-24 | Stop reason: HOSPADM

## 2018-03-22 RX ADMIN — CEFAZOLIN SODIUM 1 G: 1 INJECTION, SOLUTION INTRAVENOUS at 00:45

## 2018-03-22 RX ADMIN — SODIUM CHLORIDE 100 ML/HR: 9 INJECTION, SOLUTION INTRAVENOUS at 05:17

## 2018-03-22 RX ADMIN — HYDROCODONE BITARTRATE AND ACETAMINOPHEN 1 TABLET: 10; 325 TABLET ORAL at 12:49

## 2018-03-22 RX ADMIN — PANTOPRAZOLE SODIUM 40 MG: 40 TABLET, DELAYED RELEASE ORAL at 17:57

## 2018-03-22 RX ADMIN — SODIUM CHLORIDE 100 ML/HR: 9 INJECTION, SOLUTION INTRAVENOUS at 16:27

## 2018-03-22 RX ADMIN — METOPROLOL TARTRATE 12.5 MG: 25 TABLET ORAL at 08:56

## 2018-03-22 RX ADMIN — CEFAZOLIN SODIUM 1 G: 1 INJECTION, SOLUTION INTRAVENOUS at 17:57

## 2018-03-22 RX ADMIN — HYDROCODONE BITARTRATE AND ACETAMINOPHEN 1 TABLET: 10; 325 TABLET ORAL at 19:54

## 2018-03-22 RX ADMIN — BISACODYL 10 MG: 10 SUPPOSITORY RECTAL at 08:55

## 2018-03-22 RX ADMIN — BISACODYL 10 MG: 10 SUPPOSITORY RECTAL at 21:17

## 2018-03-22 RX ADMIN — METOPROLOL TARTRATE 12.5 MG: 25 TABLET ORAL at 21:16

## 2018-03-22 RX ADMIN — CEFAZOLIN SODIUM 1 G: 1 INJECTION, SOLUTION INTRAVENOUS at 08:55

## 2018-03-22 RX ADMIN — ONDANSETRON 4 MG: 2 INJECTION INTRAMUSCULAR; INTRAVENOUS at 12:49

## 2018-03-22 RX ADMIN — HYDROCODONE BITARTRATE AND ACETAMINOPHEN 1 TABLET: 10; 325 TABLET ORAL at 05:17

## 2018-03-22 RX ADMIN — PANTOPRAZOLE SODIUM 40 MG: 40 TABLET, DELAYED RELEASE ORAL at 06:20

## 2018-03-22 RX ADMIN — ROSUVASTATIN CALCIUM 5 MG: 5 TABLET, FILM COATED ORAL at 21:17

## 2018-03-22 RX ADMIN — ONDANSETRON 4 MG: 2 INJECTION INTRAMUSCULAR; INTRAVENOUS at 05:17

## 2018-03-22 NOTE — PLAN OF CARE
Problem: Patient Care Overview  Goal: Plan of Care Review  Outcome: Ongoing (interventions implemented as appropriate)   03/22/18 0423   Coping/Psychosocial   Plan of Care Reviewed With patient   Plan of Care Review   Progress improving   OTHER   Outcome Summary Pt has no c/o pain, LEONIDES drain dressing changed by wife per her request, nausea intermitent, up with assist, pt seems to be in no distress at this time will continue to monitor. at 0426 3/22/18       Problem: Fall Risk (Adult)  Goal: Absence of Fall  Outcome: Ongoing (interventions implemented as appropriate)      Problem: Skin Injury Risk (Adult)  Goal: Identify Related Risk Factors and Signs and Symptoms  Outcome: Ongoing (interventions implemented as appropriate)    Goal: Skin Health and Integrity  Outcome: Ongoing (interventions implemented as appropriate)

## 2018-03-22 NOTE — PROGRESS NOTES
Name: Paul English ADMIT: 3/21/2018   : 1945  PCP: Maria Luz Mcnally MD    MRN: [id number] LOS: 1 days   AGE/SEX: 72 y.o. male  ROOM: [facility]   Subjective     Feels fine, wants to eat  Passed flatus this am but no BM yet  No soa, chest pain or palpitations  No nausea or vomiting    Objective   Vital Signs  Temp:  [98.1 °F (36.7 °C)-99.1 °F (37.3 °C)] 98.1 °F (36.7 °C)  Heart Rate:  [] 75  Resp:  [16-18] 16  BP: ()/(63-84) 107/63  SpO2:  [93 %-97 %] 93 %  on  Flow (L/min):  [3] 3;   Device (Oxygen Therapy): nasal cannula  There is no height or weight on file to calculate BMI.    Physical Exam   Constitutional: No distress.   HENT:   Head: Normocephalic and atraumatic.   Eyes: EOM are normal. Pupils are equal, round, and reactive to light.   Neck: No JVD present.   Cardiovascular: Normal rate, regular rhythm and normal heart sounds.  Exam reveals no friction rub.    No murmur heard.  Pulmonary/Chest: Effort normal and breath sounds normal. No stridor. No respiratory distress. He has no wheezes.   Abdominal: Soft. Bowel sounds are normal. He exhibits no distension. There is no tenderness.   Incisions clean and dry   Skin: He is not diaphoretic.   Nursing note and vitals reviewed.      Results Review:       I reviewed the patient's new clinical results.    Results from last 7 days  Lab Units 18  0545 18  0925   WBC 10*3/mm3 9.14 8.09   HEMOGLOBIN g/dL 10.8* 10.9*   PLATELETS 10*3/mm3 131* 126*       Results from last 7 days  Lab Units 18  0545 18  0925   SODIUM mmol/L 140 141   POTASSIUM mmol/L 4.4 4.5   CHLORIDE mmol/L 106 109*   CO2 mmol/L 22.0 22.8   BUN mg/dL 16 18   CREATININE mg/dL 1.27 1.62*   GLUCOSE mg/dL 104* 78   CrCl cannot be calculated (Unknown ideal weight.).    Results from last 7 days  Lab Units 18  0545 18  0925   CALCIUM mg/dL 7.7* 7.2*   ALBUMIN g/dL  --  3.00*   MAGNESIUM mg/dL 2.3  --      Lab Results   Component Value Date    HGBA1C  5.90 (H) 03/21/2018     Glucose   Date/Time Value Ref Range Status   03/22/2018 0728 101 70 - 130 mg/dL Final   03/21/2018 2024 104 70 - 130 mg/dL Final   03/21/2018 1559 99 70 - 130 mg/dL Final   03/21/2018 1219 101 70 - 130 mg/dL Final   03/21/2018 1116 64 (L) 70 - 130 mg/dL Final         bisacodyl 10 mg Rectal BID   ceFAZolin 1 g Intravenous Q8H   insulin aspart 0-7 Units Subcutaneous 4x Daily With Meals & Nightly   metoprolol tartrate 12.5 mg Oral Q12H   pantoprazole 40 mg Oral BID AC   rosuvastatin 5 mg Oral Nightly       sodium chloride 100 mL/hr Last Rate: 100 mL/hr (03/22/18 0517)   NPO Diet NPO Except: Ice Chips      Assessment/Plan      Active Hospital Problems (** Indicates Principal Problem)    Diagnosis Date Noted   • **Atrial fibrillation with RVR [I48.91] 03/21/2018   • Thrombocytopenia [D69.6] 03/22/2018   • Type 2 diabetes mellitus with hypoglycemia [E11.649] 03/21/2018   • Essential hypertension [I10] 03/21/2018   • Bladder cancer [C67.9] 03/21/2018   • Stage 3 chronic kidney disease [N18.3] 03/21/2018   • Ureter cancer [C66.9] 03/21/2018      Resolved Hospital Problems    Diagnosis Date Noted Date Resolved   No resolved problems to display.       · Type 2 DM with hypoglycemia: A1c 5.9, cont to hold glipizide, cont SSI, when he is cleared for a diet we may need to add back glipizide  · Mild thrombocytopenia: observe, recheck in am  · CKD 3: baseline 1.5, today 1.27  · HTN: holding lisinopril  · AFib with RVR: Cardiology managing, 2D echo and metoprolol ordered, TSH normal. [id number] is 3 suggesting he will need anticoagulation--defer to cardiology  · Bladder malignancy: per primary service      Michael Valenzuela MD  [facility]  03/22/18  9:55 AM

## 2018-03-22 NOTE — PROGRESS NOTES
Hospital Follow Up    LOS:  LOS: 1 day   Patient Name: Paul English  Age/Sex: 72 y.o. male  : 1945  MRN: [id number]    Date of Hospital Visit: 18  Length of Stay: 1  Encounter Provider: Wes Stone MD  Place of Service: [facility] CARDIOLOGY    Subjective:     Follow Up for: Atrial fibrillation    Interval History: The patient has converted to normal sinus rhythm.      Objective:     Objective:  Temp:  [98.1 °F (36.7 °C)-99.1 °F (37.3 °C)] 98.1 °F (36.7 °C)  Heart Rate:  [] 75  Resp:  [16-18] 16  BP: ()/(63-84) 107/63  There is no height or weight on file to calculate BMI.    Intake/Output Summary (Last 24 hours) at 18 0947  Last data filed at 18 0622   Gross per 24 hour   Intake             3000 ml   Output             1650 ml   Net             1350 ml     There were no vitals filed for this visit.  Weight change:     Physical Exam:   General Appearance: Alert, cooperative, in no acute distress. AAOx4.   HEENT: Normocephalic.  Neck: Supple. No JVD. No Carotid bruit. No thyromegaly  Lungs: CTAB. Normal respiratory effort and rate.  Heart:: Regular rate and rhythm, normal S1 and S2, no murmurs, gallops or rubs.  Abdomen: Soft, nontender, non-distended. positive bowel sounds  Extremities: Warm, no cyanosis, or clubbing. No edema.     Lab Review:     Results from last 7 days  Lab Units 18  0545 18  0925   SODIUM mmol/L 140 141   POTASSIUM mmol/L 4.4 4.5   CHLORIDE mmol/L 106 109*   CO2 mmol/L 22.0 22.8   BUN mg/dL 16 18   CREATININE mg/dL 1.27 1.62*   GLUCOSE mg/dL 104* 78   CALCIUM mg/dL 7.7* 7.2*         Results from last 7 days  Lab Units 18  0925   TROPONIN T ng/mL <0.010       Results from last 7 days  Lab Units 18  0545 18  0925   WBC 10*3/mm3 9.14 8.09   HEMOGLOBIN g/dL 10.8* 10.9*   HEMATOCRIT % 33.8* 34.1*   PLATELETS 10*3/mm3 131* 126*           Results from last 7 days  Lab Units 18  0545    MAGNESIUM mg/dL 2.3               Results from last 7 days  Lab Units 03/22/18  0545   TSH mIU/mL 2.190         I reviewed the patient's new clinical results.          I personally viewed and interpreted the patient's EKG/Telemetry data.  Current Medications:   Scheduled Meds:  bisacodyl 10 mg Rectal BID   ceFAZolin 1 g Intravenous Q8H   insulin aspart 0-7 Units Subcutaneous 4x Daily With Meals & Nightly   metoprolol tartrate 12.5 mg Oral Q12H   pantoprazole 40 mg Oral BID AC   rosuvastatin 5 mg Oral Nightly     Continuous Infusions:  sodium chloride 100 mL/hr Last Rate: 100 mL/hr (03/22/18 0517)       Allergies:  No Known Allergies    Assessment & Plan     Principal Problem:    Atrial fibrillation with RVR  Active Problems:    Type 2 diabetes mellitus with hypoglycemia    Essential hypertension    Bladder cancer    Stage 3 chronic kidney disease    Ureter cancer          Plan: 1.  Continue metoprolol tartrate at present dose  2.  No need for anticoagulation at this time  3.  I will see him back in the office in 6-8 weeks.  I've instructed the patient to call me if he has any recurrence of his arrhythmia.  I believe this may be associated with the stress of his surgery.  His echocardiogram does not reveal any significant abnormality other than left ventricular hypertrophy..    I appreciate the opportunity to participate in this patient's care  Wes Stone MD  03/22/18

## 2018-03-22 NOTE — H&P
CHIEF COMPLAINT: Recent nephroureterectomy and new onset atrial fibrillation.    HISTORY OF PRESENT ILLNESS: This patient recently underwent a nephroureterectomy by Dr. Jeffry Suero. He developed postoperative atrial fibrillation. He was transferred to [facility] for further cardiac evaluation. He subsequently converted to normal sinus. The patient did not have any complications, shortness of breath, dizziness, or lightheadedness during his bout of atrial fibrillation. He did have a rapid ventricular response. He denies prior atrial fibrillation. He has mild residual incisional pain following the surgery. He does note some discharge around his drain. He also notes some hematuria which seems to be worse with walking since surgery. He is admitted now for cardiac evaluation and routine postoperative care.    PAST MEDICAL HISTORY:  1.  History of transitional cell carcinoma.  2.  Diabetes.  3.  Hyperlipidemia.  4.  Hypertension.  5.  New onset AFib.    PAST SURGICAL HISTORY:  1.  Foot surgery.  2.  Previous surgery for transitional cell carcinoma.    SOCIAL HISTORY: The patient has never smoked. His wife is a nurse. Recently retired from [facility] [employer].    PHYSICAL EXAMINATION:  GENERAL: He is a well-nourished, well-developed white male, in no apparent distress, alert and oriented and oriented x3.  COR: Regular rate and rhythm. No S3, S4, murmur, rub.  LUNGS:  Clear to auscultation.  ABDOMEN: Flanks benign. He has mild abdominal distention.  EXTERNAL GENITALIA: Normal. He has mild hematuria. His surgical wounds are healing beautifully.     IMPRESSION AND PLAN:  1.  New onset atrial fibrillation. Patient is currently under care of the cardiologist. He has reverted to sinus rhythm this morning.  2.  Transitional cell carcinoma. Is doing nicely following his recent nephroureterectomy. He does have a mild ileus. I have ordered Dulcolax suppositories. Have asked the nurses to check room air saturations, see if he needs to  continue oxygen.

## 2018-03-23 VITALS
RESPIRATION RATE: 18 BRPM | DIASTOLIC BLOOD PRESSURE: 74 MMHG | TEMPERATURE: 98.5 F | HEART RATE: 76 BPM | OXYGEN SATURATION: 91 % | SYSTOLIC BLOOD PRESSURE: 122 MMHG

## 2018-03-23 LAB
ANION GAP SERPL CALCULATED.3IONS-SCNC: 7.7 MMOL/L
BASOPHILS # BLD AUTO: 0.01 10*3/MM3 (ref 0–0.2)
BASOPHILS NFR BLD AUTO: 0.1 % (ref 0–1.5)
BUN BLD-MCNC: 15 MG/DL (ref 8–23)
BUN/CREAT SERPL: 11 (ref 7–25)
CALCIUM SPEC-SCNC: 8.2 MG/DL (ref 8.6–10.5)
CHLORIDE SERPL-SCNC: 106 MMOL/L (ref 98–107)
CO2 SERPL-SCNC: 25.3 MMOL/L (ref 22–29)
CREAT BLD-MCNC: 1.36 MG/DL (ref 0.76–1.27)
DEPRECATED RDW RBC AUTO: 44.7 FL (ref 37–54)
EOSINOPHIL # BLD AUTO: 0.28 10*3/MM3 (ref 0–0.7)
EOSINOPHIL NFR BLD AUTO: 3.1 % (ref 0.3–6.2)
ERYTHROCYTE [DISTWIDTH] IN BLOOD BY AUTOMATED COUNT: 12.9 % (ref 11.5–14.5)
GFR SERPL CREATININE-BSD FRML MDRD: 51 ML/MIN/1.73
GLUCOSE BLD-MCNC: 102 MG/DL (ref 65–99)
GLUCOSE BLDC GLUCOMTR-MCNC: 101 MG/DL (ref 70–130)
GLUCOSE BLDC GLUCOMTR-MCNC: 121 MG/DL (ref 70–130)
GLUCOSE BLDC GLUCOMTR-MCNC: 137 MG/DL (ref 70–130)
GLUCOSE BLDC GLUCOMTR-MCNC: 164 MG/DL (ref 70–130)
HCT VFR BLD AUTO: 32.4 % (ref 40.4–52.2)
HGB BLD-MCNC: 10.6 G/DL (ref 13.7–17.6)
IMM GRANULOCYTES # BLD: 0.03 10*3/MM3 (ref 0–0.03)
IMM GRANULOCYTES NFR BLD: 0.3 % (ref 0–0.5)
LYMPHOCYTES # BLD AUTO: 1.28 10*3/MM3 (ref 0.9–4.8)
LYMPHOCYTES NFR BLD AUTO: 14 % (ref 19.6–45.3)
MCH RBC QN AUTO: 31.3 PG (ref 27–32.7)
MCHC RBC AUTO-ENTMCNC: 32.7 G/DL (ref 32.6–36.4)
MCV RBC AUTO: 95.6 FL (ref 79.8–96.2)
MONOCYTES # BLD AUTO: 0.85 10*3/MM3 (ref 0.2–1.2)
MONOCYTES NFR BLD AUTO: 9.3 % (ref 5–12)
NEUTROPHILS # BLD AUTO: 6.7 10*3/MM3 (ref 1.9–8.1)
NEUTROPHILS NFR BLD AUTO: 73.2 % (ref 42.7–76)
PLATELET # BLD AUTO: 156 10*3/MM3 (ref 140–500)
PMV BLD AUTO: 11.2 FL (ref 6–12)
POTASSIUM BLD-SCNC: 4.6 MMOL/L (ref 3.5–5.2)
RBC # BLD AUTO: 3.39 10*6/MM3 (ref 4.6–6)
SODIUM BLD-SCNC: 139 MMOL/L (ref 136–145)
WBC NRBC COR # BLD: 9.15 10*3/MM3 (ref 4.5–10.7)

## 2018-03-23 PROCEDURE — 25010000003 CEFAZOLIN 1 GM/50ML SOLUTION: Performed by: UROLOGY

## 2018-03-23 PROCEDURE — 85025 COMPLETE CBC W/AUTO DIFF WBC: CPT | Performed by: INTERNAL MEDICINE

## 2018-03-23 PROCEDURE — 63710000001 INSULIN ASPART PER 5 UNITS: Performed by: UROLOGY

## 2018-03-23 PROCEDURE — 80048 BASIC METABOLIC PNL TOTAL CA: CPT | Performed by: INTERNAL MEDICINE

## 2018-03-23 PROCEDURE — 82962 GLUCOSE BLOOD TEST: CPT

## 2018-03-23 RX ADMIN — HYDROCODONE BITARTRATE AND ACETAMINOPHEN 1 TABLET: 10; 325 TABLET ORAL at 21:03

## 2018-03-23 RX ADMIN — METOPROLOL TARTRATE 12.5 MG: 25 TABLET ORAL at 08:41

## 2018-03-23 RX ADMIN — PANTOPRAZOLE SODIUM 40 MG: 40 TABLET, DELAYED RELEASE ORAL at 17:22

## 2018-03-23 RX ADMIN — PANTOPRAZOLE SODIUM 40 MG: 40 TABLET, DELAYED RELEASE ORAL at 08:41

## 2018-03-23 RX ADMIN — CEFAZOLIN SODIUM 1 G: 1 INJECTION, SOLUTION INTRAVENOUS at 17:22

## 2018-03-23 RX ADMIN — CEFAZOLIN SODIUM 1 G: 1 INJECTION, SOLUTION INTRAVENOUS at 02:39

## 2018-03-23 RX ADMIN — HYDROCODONE BITARTRATE AND ACETAMINOPHEN 1 TABLET: 10; 325 TABLET ORAL at 05:38

## 2018-03-23 RX ADMIN — HYDROCODONE BITARTRATE AND ACETAMINOPHEN 1 TABLET: 10; 325 TABLET ORAL at 13:50

## 2018-03-23 RX ADMIN — CEFAZOLIN SODIUM 1 G: 1 INJECTION, SOLUTION INTRAVENOUS at 08:45

## 2018-03-23 RX ADMIN — ROSUVASTATIN CALCIUM 5 MG: 5 TABLET, FILM COATED ORAL at 20:57

## 2018-03-23 RX ADMIN — METOPROLOL TARTRATE 12.5 MG: 25 TABLET ORAL at 20:57

## 2018-03-23 RX ADMIN — SODIUM CHLORIDE 100 ML/HR: 9 INJECTION, SOLUTION INTRAVENOUS at 02:39

## 2018-03-23 RX ADMIN — SODIUM CHLORIDE 100 ML/HR: 9 INJECTION, SOLUTION INTRAVENOUS at 13:57

## 2018-03-23 NOTE — PROGRESS NOTES
Doing well  Post op  No additional a fib  vss af  Wounds ok  Imp resolving postop isleus  p increase diet and hime sat or sunday

## 2018-03-23 NOTE — PROGRESS NOTES
Continued Stay Note  [facility]     Patient Name: Paul English  MRN: [id number]  Today's Date: 3/23/2018    Admit Date: 3/21/2018          Discharge Plan     Row Name 03/23/18 1640       Plan    Plan Home with spouse     Patient/Family in Agreement with Plan yes    Plan Comments Spoke with pt and his spouse, Lucina at bedside. Pts plan for discharge remains the same. Home with spouse. Pt and spouse deny any discharge planning needs at this time. Per MD note, anticipated discharge is Sat/Sun. CCP to follow.               Discharge Codes    No documentation.           Alison Cherry RN

## 2018-03-23 NOTE — PLAN OF CARE
Problem: Patient Care Overview  Goal: Plan of Care Review  Outcome: Ongoing (interventions implemented as appropriate)   03/23/18 1812   Coping/Psychosocial   Plan of Care Reviewed With patient;spouse   OTHER   Outcome Summary Pt A&Ox4, minimal abdominal pain loratab given around 1400, hodges care done per wife as pt and wife requested, LEONIDES drain leaking around site MD Barton aware, stripped drain and gently irrigated hodges per verbal order, changed dressing multiple times, pt having good UOP, tolerating IVF, VSS, will monitor.        Problem: Fall Risk (Adult)  Goal: Absence of Fall  Outcome: Ongoing (interventions implemented as appropriate)      Problem: Skin Injury Risk (Adult)  Goal: Skin Health and Integrity  Outcome: Ongoing (interventions implemented as appropriate)

## 2018-03-23 NOTE — PLAN OF CARE
Problem: Patient Care Overview  Goal: Plan of Care Review  Outcome: Ongoing (interventions implemented as appropriate)   03/23/18 0633   Coping/Psychosocial   Plan of Care Reviewed With patient;spouse   Plan of Care Review   Progress improving   OTHER   Outcome Summary Pt rsted well. Medicated for pain 1x with good relief. 2 large BM overnight. Ambulated around nurses station. No c/o nausea. VSS, no s/s acute distress, will continue to monitor     Goal: Individualization and Mutuality  Outcome: Ongoing (interventions implemented as appropriate)      Problem: Fall Risk (Adult)  Goal: Absence of Fall  Outcome: Ongoing (interventions implemented as appropriate)      Problem: Skin Injury Risk (Adult)  Goal: Skin Health and Integrity  Outcome: Ongoing (interventions implemented as appropriate)

## 2018-03-23 NOTE — PROGRESS NOTES
Name: Paul English ADMIT: 3/21/2018   : 1945  PCP: Maria Luz Mcnally MD    MRN: [id number] LOS: 2 days   AGE/SEX: 72 y.o. male  ROOM: [facility]   Subjective     Feels fine, doing well with diet  No abd pain at all  No soa, chest pain or palpitations  No nausea or vomiting    Objective   Vital Signs  Temp:  [97.9 °F (36.6 °C)-99.5 °F (37.5 °C)] 97.9 °F (36.6 °C)  Heart Rate:  [56-82] 56  Resp:  [18-20] 18  BP: (114-131)/(67-84) 121/74  SpO2:  [91 %-94 %] 94 %  on  Flow (L/min):  [0.5-1] 0.5;   Device (Oxygen Therapy): room air  There is no height or weight on file to calculate BMI.    Physical Exam   Constitutional: No distress.   HENT:   Head: Normocephalic and atraumatic.   Eyes: EOM are normal. Pupils are equal, round, and reactive to light.   Neck: No JVD present.   Cardiovascular: Normal rate, regular rhythm and normal heart sounds.  Exam reveals no friction rub.    No murmur heard.  Pulmonary/Chest: Effort normal and breath sounds normal. No stridor. No respiratory distress. He has no wheezes.   Abdominal: Soft. Bowel sounds are normal. He exhibits no distension. There is no tenderness.   Incisions clean and dry  LEONIDES drain in place with serosanguinous fluid in drain and draining around the tubing. Significant amount     Skin: He is not diaphoretic.   Psychiatric: He has a normal mood and affect. His behavior is normal.   Nursing note and vitals reviewed.      Results Review:       I reviewed the patient's new clinical results.    Results from last 7 days  Lab Units 18  0540 18  0545 18  0925   WBC 10*3/mm3 9.15 9.14 8.09   HEMOGLOBIN g/dL 10.6* 10.8* 10.9*   PLATELETS 10*3/mm3 156 131* 126*       Results from last 7 days  Lab Units 18  0540 18  0545 18  0925   SODIUM mmol/L 139 140 141   POTASSIUM mmol/L 4.6 4.4 4.5   CHLORIDE mmol/L 106 106 109*   CO2 mmol/L 25.3 22.0 22.8   BUN mg/dL 15 16 18   CREATININE mg/dL 1.36* 1.27 1.62*   GLUCOSE mg/dL 102* 104* 78   CrCl  cannot be calculated (Unknown ideal weight.).    Results from last 7 days  Lab Units 03/23/18  0540 03/22/18  0545 03/21/18  0925   CALCIUM mg/dL 8.2* 7.7* 7.2*   ALBUMIN g/dL  --   --  3.00*   MAGNESIUM mg/dL  --  2.3  --      Lab Results   Component Value Date    HGBA1C 5.90 (H) 03/21/2018     Glucose   Date/Time Value Ref Range Status   03/23/2018 1148 121 70 - 130 mg/dL Final   03/23/2018 0722 101 70 - 130 mg/dL Final   03/22/2018 2042 102 70 - 130 mg/dL Final   03/22/2018 1610 100 70 - 130 mg/dL Final   03/22/2018 1129 104 70 - 130 mg/dL Final   03/22/2018 0728 101 70 - 130 mg/dL Final   03/21/2018 2024 104 70 - 130 mg/dL Final   03/21/2018 1559 99 70 - 130 mg/dL Final         bisacodyl 10 mg Rectal BID   ceFAZolin 1 g Intravenous Q8H   insulin aspart 0-7 Units Subcutaneous 4x Daily With Meals & Nightly   metoprolol tartrate 12.5 mg Oral Q12H   pantoprazole 40 mg Oral BID AC   rosuvastatin 5 mg Oral Nightly       sodium chloride 100 mL/hr Last Rate: 100 mL/hr (03/23/18 1357)   Diet Regular; Consistent Carbohydrate      Assessment/Plan      Active Hospital Problems (** Indicates Principal Problem)    Diagnosis Date Noted   • **Atrial fibrillation with RVR [I48.91] 03/21/2018   • Thrombocytopenia [D69.6] 03/22/2018   • Type 2 diabetes mellitus with hypoglycemia [E11.649] 03/21/2018   • Essential hypertension [I10] 03/21/2018   • Bladder cancer [C67.9] 03/21/2018   • Stage 3 chronic kidney disease [N18.3] 03/21/2018   • Ureter cancer [C66.9] 03/21/2018      Resolved Hospital Problems    Diagnosis Date Noted Date Resolved   No resolved problems to display.       · Type 2 DM with hypoglycemia: BS is perfect now, A1c 5.9, cont to hold glipizide now and on discharge, cont SSI  · Mild thrombocytopenia: resolved  · CKD 3: baseline 1.5, today 1.36  · HTN: holding lisinopril, likely will not need it on discharge if BP stays like this  · AFib with RVR: Cardiology managing, 2D echo and metoprolol ordered, TSH normal.  [id number] is 3 suggesting he might need anticoagulation but no need per cardiology. F/u Dr. Stone in 6-8 weeks  · Bladder malignancy: per primary service, draining around LEONIDES drain.  D/W RN and she's contacting the primary service      Michael Valenzuela MD  [facility]  03/23/18  1:57 PM

## 2018-03-24 LAB
GLUCOSE BLDC GLUCOMTR-MCNC: 130 MG/DL (ref 70–130)
GLUCOSE BLDC GLUCOMTR-MCNC: 133 MG/DL (ref 70–130)
GLUCOSE BLDC GLUCOMTR-MCNC: 149 MG/DL (ref 70–130)

## 2018-03-24 PROCEDURE — 82962 GLUCOSE BLOOD TEST: CPT

## 2018-03-24 PROCEDURE — 25010000003 CEFAZOLIN 1 GM/50ML SOLUTION: Performed by: UROLOGY

## 2018-03-24 RX ORDER — ONDANSETRON 4 MG/1
4 TABLET, FILM COATED ORAL ONCE AS NEEDED
Status: DISCONTINUED | OUTPATIENT
Start: 2018-03-24 | End: 2018-03-24 | Stop reason: HOSPADM

## 2018-03-24 RX ORDER — HYDROCODONE BITARTRATE AND ACETAMINOPHEN 5; 325 MG/1; MG/1
1 TABLET ORAL ONCE AS NEEDED
Status: DISCONTINUED | OUTPATIENT
Start: 2018-03-24 | End: 2018-03-24 | Stop reason: HOSPADM

## 2018-03-24 RX ADMIN — PANTOPRAZOLE SODIUM 40 MG: 40 TABLET, DELAYED RELEASE ORAL at 08:57

## 2018-03-24 RX ADMIN — CEFAZOLIN SODIUM 1 G: 1 INJECTION, SOLUTION INTRAVENOUS at 02:20

## 2018-03-24 RX ADMIN — METOPROLOL TARTRATE 12.5 MG: 25 TABLET ORAL at 08:57

## 2018-03-24 RX ADMIN — HYDROCODONE BITARTRATE AND ACETAMINOPHEN 1 TABLET: 10; 325 TABLET ORAL at 08:57

## 2018-03-24 RX ADMIN — CEFAZOLIN SODIUM 1 G: 1 INJECTION, SOLUTION INTRAVENOUS at 08:58

## 2018-03-24 NOTE — PROGRESS NOTES
Name: Paul Egnlish ADMIT: 3/21/2018   : 1945  PCP: Maria Luz Mcnally MD    MRN: [id number] LOS: 3 days   AGE/SEX: 72 y.o. male  ROOM: [facility]   Subjective     Feels fine, doing well with diet  No abd pain at all  No soa, chest pain or palpitations  No nausea or vomiting  Really wants to go home    Objective   Vital Signs  Temp:  [97.9 °F (36.6 °C)-98.9 °F (37.2 °C)] 98.5 °F (36.9 °C)  Heart Rate:  [56-76] 76  Resp:  [18] 18  BP: (113-122)/(74-77) 122/74  SpO2:  [90 %-94 %] 91 %  on   ;   Device (Oxygen Therapy): room air  There is no height or weight on file to calculate BMI.    Physical Exam   Constitutional: No distress.   HENT:   Head: Normocephalic and atraumatic.   Eyes: EOM are normal. Pupils are equal, round, and reactive to light.   Neck: No JVD present.   Cardiovascular: Normal rate, regular rhythm and normal heart sounds.  Exam reveals no friction rub.    No murmur heard.  Pulmonary/Chest: Effort normal and breath sounds normal. No stridor. No respiratory distress. He has no wheezes.   Abdominal: Soft. Bowel sounds are normal. He exhibits no distension. There is no tenderness.   Incisions clean and dry  LEONIDES drain in place with serosanguinous fluid in drain and draining around the tubing. Significant amount     Skin: He is not diaphoretic.   Psychiatric: He has a normal mood and affect. His behavior is normal.   Nursing note and vitals reviewed.      Results Review:       I reviewed the patient's new clinical results.    Results from last 7 days  Lab Units 18  0540 18  0545 18  0925   WBC 10*3/mm3 9.15 9.14 8.09   HEMOGLOBIN g/dL 10.6* 10.8* 10.9*   PLATELETS 10*3/mm3 156 131* 126*       Results from last 7 days  Lab Units 18  0540 18  0545 18  0925   SODIUM mmol/L 139 140 141   POTASSIUM mmol/L 4.6 4.4 4.5   CHLORIDE mmol/L 106 106 109*   CO2 mmol/L 25.3 22.0 22.8   BUN mg/dL 15 16 18   CREATININE mg/dL 1.36* 1.27 1.62*   GLUCOSE mg/dL 102* 104* 78   CrCl  cannot be calculated (Unknown ideal weight.).    Results from last 7 days  Lab Units 03/23/18  0540 03/22/18  0545 03/21/18  0925   CALCIUM mg/dL 8.2* 7.7* 7.2*   ALBUMIN g/dL  --   --  3.00*   MAGNESIUM mg/dL  --  2.3  --      Lab Results   Component Value Date    HGBA1C 5.90 (H) 03/21/2018     Glucose   Date/Time Value Ref Range Status   03/24/2018 0730 130 70 - 130 mg/dL Final   03/24/2018 0111 149 (H) 70 - 130 mg/dL Final   03/23/2018 2127 164 (H) 70 - 130 mg/dL Final   03/23/2018 1616 137 (H) 70 - 130 mg/dL Final   03/23/2018 1148 121 70 - 130 mg/dL Final   03/23/2018 0722 101 70 - 130 mg/dL Final   03/22/2018 2042 102 70 - 130 mg/dL Final   03/22/2018 1610 100 70 - 130 mg/dL Final         bisacodyl 10 mg Rectal BID   ceFAZolin 1 g Intravenous Q8H   insulin aspart 0-7 Units Subcutaneous 4x Daily With Meals & Nightly   metoprolol tartrate 12.5 mg Oral Q12H   pantoprazole 40 mg Oral BID AC   rosuvastatin 5 mg Oral Nightly       sodium chloride 100 mL/hr Last Rate: 100 mL/hr (03/23/18 1357)   Diet Regular; Consistent Carbohydrate      Assessment/Plan      Active Hospital Problems (** Indicates Principal Problem)    Diagnosis Date Noted   • **Atrial fibrillation with RVR [I48.91] 03/21/2018   • Thrombocytopenia [D69.6] 03/22/2018   • Type 2 diabetes mellitus with hypoglycemia [E11.649] 03/21/2018   • Essential hypertension [I10] 03/21/2018   • Bladder cancer [C67.9] 03/21/2018   • Stage 3 chronic kidney disease [N18.3] 03/21/2018   • Ureter cancer [C66.9] 03/21/2018      Resolved Hospital Problems    Diagnosis Date Noted Date Resolved   No resolved problems to display.       · Type 2 DM with hypoglycemia: BS is good now, BS starting to creep up but prefer a little higher in the short term.A1c 5.9, cont to hold glipizide now and on discharge, cont SSI  · Mild thrombocytopenia: resolved  · CKD 3: baseline 1.5, now 1.36  · HTN: holding lisinopril, likely will not need it on discharge if BP stays like this  · AFib with  RVR: Cardiology managing, 2D echo and metoprolol ordered, TSH normal. [id number] is 3 suggesting he might need anticoagulation but no need per cardiology. F/u Dr. Stone in 6-8 weeks  · Bladder malignancy: per primary service, draining around LEONIDES drain.  D/W RN and she's contacting the primary service    D/W Pt and his wife to not take glipizide or lisinopril on discharge for about 1 week until they see PCP around that time    OK to discharge today from IM standpoint  Michael Valenzuela MD  [facility]  03/24/18  10:19 AM

## 2018-03-24 NOTE — PROGRESS NOTES
Pod 5  A fib  Alert no cp sob  no pain wounds clear bm x 3  hodges urine clear lab stable  junior 8 cc  Cleared for d.c per cardiology follow up appt made  D/c  Home remove drain

## 2018-03-24 NOTE — PLAN OF CARE
Problem: Patient Care Overview  Goal: Plan of Care Review  Outcome: Ongoing (interventions implemented as appropriate)    Goal: Individualization and Mutuality  Outcome: Ongoing (interventions implemented as appropriate)    Goal: Discharge Needs Assessment  Outcome: Ongoing (interventions implemented as appropriate)    Goal: Interprofessional Rounds/Family Conf  Outcome: Ongoing (interventions implemented as appropriate)      Problem: Fall Risk (Adult)  Goal: Absence of Fall  Outcome: Ongoing (interventions implemented as appropriate)      Problem: Skin Injury Risk (Adult)  Goal: Skin Health and Integrity  Outcome: Ongoing (interventions implemented as appropriate)

## 2018-03-24 NOTE — PLAN OF CARE
Problem: Patient Care Overview  Goal: Individualization and Mutuality  Outcome: Ongoing (interventions implemented as appropriate)    Goal: Discharge Needs Assessment  Outcome: Ongoing (interventions implemented as appropriate)    Goal: Interprofessional Rounds/Family Conf  Outcome: Ongoing (interventions implemented as appropriate)      Problem: Fall Risk (Adult)  Goal: Absence of Fall  Outcome: Ongoing (interventions implemented as appropriate)      Problem: Skin Injury Risk (Adult)  Goal: Skin Health and Integrity  Outcome: Ongoing (interventions implemented as appropriate)

## 2018-03-26 NOTE — DISCHARGE SUMMARY
Discharge diagnosis postop atrial fibrillation asked history of transitional cell carcinoma post nephroureterectomy diabetes hyperlipidemia hypertension new-onset A. fib    Consultation cardiology    Hospital course  patient developed postoperative atrial fibrillation after recent nephroureterectomyDr. trammell was consulted for new onset atrial fibrillation started on metoprolol with a good ventricular response and was discharged without anticoagulation with follow-up with cardiology in 6-8 weeks continuation of his postoperative diet activity and medications follow up with Dr. Suero

## 2018-03-26 NOTE — PROGRESS NOTES
Case Management Discharge Note    Final Note: Pt dc'd home     Destination     No service coordination in this encounter.      Durable Medical Equipment     No service coordination in this encounter.      Dialysis/Infusion     No service coordination in this encounter.      Home Medical Care     No service coordination in this encounter.      Social Care     No service coordination in this encounter.        Other: Other (private vehicle )    Final Discharge Disposition Code: 01 - home or self-care

## 2018-04-03 ENCOUNTER — TELEPHONE (OUTPATIENT)
Dept: CARDIOLOGY | Facility: CLINIC | Age: 73
End: 2018-04-03

## 2018-04-03 RX ORDER — METOPROLOL SUCCINATE 25 MG/1
25 TABLET, EXTENDED RELEASE ORAL DAILY
Qty: 90 TABLET | Refills: 1 | Status: SHIPPED | OUTPATIENT
Start: 2018-04-03 | End: 2018-09-14 | Stop reason: SDUPTHER

## 2018-04-03 NOTE — TELEPHONE ENCOUNTER
Pt was released from the hospital Saturday & they thought he was supposed to be on Metoprolol for the afib, they did not send in a rx for the Metoprolol. Is he supposed to be taking this. Pt # [phone]. dmk

## 2018-05-09 ENCOUNTER — OFFICE VISIT (OUTPATIENT)
Dept: CARDIOLOGY | Facility: CLINIC | Age: 73
End: 2018-05-09

## 2018-05-09 VITALS
HEART RATE: 47 BPM | SYSTOLIC BLOOD PRESSURE: 132 MMHG | BODY MASS INDEX: 24.52 KG/M2 | HEIGHT: 73 IN | WEIGHT: 185 LBS | DIASTOLIC BLOOD PRESSURE: 88 MMHG

## 2018-05-09 DIAGNOSIS — I48.91 ATRIAL FIBRILLATION WITH RVR (HCC): Primary | ICD-10-CM

## 2018-05-09 DIAGNOSIS — I10 ESSENTIAL HYPERTENSION: ICD-10-CM

## 2018-05-09 PROCEDURE — 93000 ELECTROCARDIOGRAM COMPLETE: CPT | Performed by: INTERNAL MEDICINE

## 2018-05-09 PROCEDURE — 99214 OFFICE O/P EST MOD 30 MIN: CPT | Performed by: INTERNAL MEDICINE

## 2018-05-09 NOTE — PROGRESS NOTES
Date of Office Visit: 2018  Encounter Provider: Wes Stone MD  Place of Service: [facility] CARDIOLOGY  Patient Name: Paul English  :1945      Chief Complaint   Patient presents with   • Atrial Fibrillation     History of Present Illness  The patient is a 72-year-old white male who I saw when he was hospitalized for bladder surgery.  He developed postoperative atrial fibrillation that converted back to sinus rhythm.  He remains on metoprolol and has not had any recurrence that he is aware of.  He denies any chest discomfort, lightheadedness, dizziness, orthopnea nor paroxysmal nocturnal dyspnea.  Any palpitation that he experienced in the past have since resolved      Past Medical History:   Diagnosis Date   • Atrial fibrillation    • Cancer     BLADDER   • Diabetes mellitus    • Hyperlipidemia    • Hypertension          Past Surgical History:   Procedure Laterality Date   • BLADDER SURGERY     • FOOT SURGERY      RIGHT FOOT           Current Outpatient Prescriptions:   •  Cholecalciferol (VITAMIN D3) 2000 units capsule, Take 2,000 Units by mouth., Disp: , Rfl:   •  glipiZIDE (GLUCOTROL) 10 MG tablet, Take 10 mg by mouth 2 (Two) Times a Day Before Meals., Disp: , Rfl:   •  lisinopril (PRINIVIL,ZESTRIL) 20 MG tablet, Take 20 mg by mouth Daily., Disp: , Rfl:   •  metoprolol succinate XL (TOPROL-XL) 25 MG 24 hr tablet, Take 1 tablet by mouth Daily., Disp: 90 tablet, Rfl: 1  •  rosuvastatin (CRESTOR) 5 MG tablet, Take 5 mg by mouth Daily., Disp: , Rfl:       Social History     Social History   • Marital status:      Spouse name: N/A   • Number of children: N/A   • Years of education: N/A     Occupational History   • Not on file.     Social History Main Topics   • Smoking status: Never Smoker   • Smokeless tobacco: Not on file   • Alcohol use No   • Drug use: No   • Sexual activity: Not on file     Other Topics Concern   • Not on file     Social History  "Narrative   • No narrative on file         Review of Systems   Constitution: Negative.   HENT: Negative.    Eyes: Negative.    Cardiovascular: Negative.    Respiratory: Negative.    Endocrine: Negative.    Skin: Negative.    Musculoskeletal: Negative.    Gastrointestinal: Negative.    Neurological: Negative.    Psychiatric/Behavioral: Negative.        Procedures      ECG 12 Lead  Date/Time: 5/9/2018 2:02 PM  Performed by: ABDI JORDAN  Authorized by: ABDI JORDAN   Comparison: compared with previous ECG from 3/22/2018  Similar to previous ECG  Rhythm: sinus bradycardia  Rate: normal  Conduction: non-specific intraventricular conduction delay  QRS axis: normal                  Objective:    /88   Pulse (!) 47   Ht 185.4 cm (73\")   Wt 83.9 kg (185 lb)   BMI 24.41 kg/m²         Physical Exam   Constitutional: He is oriented to person, place, and time. He appears well-developed and well-nourished.   HENT:   Head: Normocephalic.   Eyes: Pupils are equal, round, and reactive to light.   Neck: Normal range of motion. No JVD present. Carotid bruit is not present. No thyromegaly present.   Cardiovascular: Normal rate, regular rhythm, S1 normal, S2 normal, normal heart sounds and intact distal pulses.  Exam reveals no gallop and no friction rub.    No murmur heard.  Pulmonary/Chest: Effort normal and breath sounds normal.   Abdominal: Soft. Bowel sounds are normal.   Musculoskeletal: He exhibits no edema.   Neurological: He is alert and oriented to person, place, and time.   Skin: Skin is warm, dry and intact. No erythema.   Psychiatric: He has a normal mood and affect.   Vitals reviewed.          Assessment:       Diagnosis Plan   1. Atrial fibrillation with RVR     2. Essential hypertension       1. Atrial Fibrillation and Atrial Flutter  Assessment  • The patient has paroxysmal atrial fibrillation  • The patient's CHADS2-VASc score is 2  • A [id number] score of 2 or more is considered a high risk " for a thromboembolic event  • Aspirin prescribed    Plan  • Attempt to maintain sinus rhythm  • Continue aspirin for antithrombotic therapy, bleeding issues discussed  • Continue beta blocker for rhythm control      2 hypertension: Controlled       Plan:       No change in medications at this time.  I'll see him back toward the end of the year.  Hopefully he won't have any recurrence of his atrial fibrillation

## 2018-09-14 RX ORDER — METOPROLOL SUCCINATE 25 MG/1
25 TABLET, EXTENDED RELEASE ORAL DAILY
Qty: 90 TABLET | Refills: 1 | Status: SHIPPED | OUTPATIENT
Start: 2018-09-14 | End: 2018-11-29 | Stop reason: SDUPTHER

## 2018-11-29 ENCOUNTER — OFFICE VISIT (OUTPATIENT)
Dept: CARDIOLOGY | Facility: CLINIC | Age: 73
End: 2018-11-29

## 2018-11-29 VITALS
SYSTOLIC BLOOD PRESSURE: 118 MMHG | HEART RATE: 60 BPM | BODY MASS INDEX: 25.71 KG/M2 | WEIGHT: 194 LBS | HEIGHT: 73 IN | DIASTOLIC BLOOD PRESSURE: 74 MMHG

## 2018-11-29 DIAGNOSIS — I10 ESSENTIAL HYPERTENSION: ICD-10-CM

## 2018-11-29 DIAGNOSIS — I48.91 ATRIAL FIBRILLATION WITH RVR (HCC): Primary | ICD-10-CM

## 2018-11-29 PROCEDURE — 93000 ELECTROCARDIOGRAM COMPLETE: CPT | Performed by: INTERNAL MEDICINE

## 2018-11-29 PROCEDURE — 99214 OFFICE O/P EST MOD 30 MIN: CPT | Performed by: INTERNAL MEDICINE

## 2018-11-29 RX ORDER — METOPROLOL SUCCINATE 25 MG/1
25 TABLET, EXTENDED RELEASE ORAL DAILY
Qty: 90 TABLET | Refills: 1 | Status: SHIPPED | OUTPATIENT
Start: 2018-11-29 | End: 2022-08-02 | Stop reason: SDUPTHER

## 2018-11-29 NOTE — PROGRESS NOTES
Date of Office Visit: 2018  Encounter Provider: Wes Stone MD  Place of Service: [facility] CARDIOLOGY  Patient Name: Paul English  :1945      Chief Complaint   Patient presents with   • Atrial Fibrillation     History of Present Illness    The patient is a 73-year-old white male with a history of paroxysmal atrial fibrillation who returns to the office today for follow-up.  In the last 6 months he has been doing very well.  He has not had any recurrence of the atrial fibrillation.  He does not complain of any lightheadedness, dizziness, orthopnea or paroxysmal nocturnal dyspnea.    He has a history of hypertension.  He checks his blood pressure occasionally and is a was within normal limits.    Past Medical History:   Diagnosis Date   • Atrial fibrillation (CMS/HCC)    • Cancer (CMS/HCC)     BLADDER   • Diabetes mellitus (CMS/HCC)    • Hyperlipidemia    • Hypertension          Past Surgical History:   Procedure Laterality Date   • BLADDER SURGERY     • FOOT SURGERY      RIGHT FOOT           Current Outpatient Medications:   •  Cholecalciferol (VITAMIN D3) 2000 units capsule, Take 2,000 Units by mouth., Disp: , Rfl:   •  glipiZIDE (GLUCOTROL) 10 MG tablet, Take 10 mg by mouth 2 (Two) Times a Day Before Meals., Disp: , Rfl:   •  lisinopril (PRINIVIL,ZESTRIL) 20 MG tablet, Take 20 mg by mouth Daily., Disp: , Rfl:   •  metoprolol succinate XL (TOPROL-XL) 25 MG 24 hr tablet, TAKE 1 TABLET BY MOUTH DAILY, Disp: 90 tablet, Rfl: 1  •  Multiple Vitamins-Minerals (OCUVITE ADULT 50+ PO), Take  by mouth., Disp: , Rfl:   •  rosuvastatin (CRESTOR) 5 MG tablet, Take 5 mg by mouth Daily., Disp: , Rfl:       Social History     Socioeconomic History   • Marital status:      Spouse name: Not on file   • Number of children: Not on file   • Years of education: Not on file   • Highest education level: Not on file   Social Needs   • Financial resource strain: Not on file  "  • Food insecurity - worry: Not on file   • Food insecurity - inability: Not on file   • Transportation needs - medical: Not on file   • Transportation needs - non-medical: Not on file   Occupational History   • Not on file   Tobacco Use   • Smoking status: Never Smoker   Substance and Sexual Activity   • Alcohol use: No   • Drug use: No   • Sexual activity: Not on file   Other Topics Concern   • Not on file   Social History Narrative   • Not on file         Review of Systems   Constitution: Negative.   HENT: Negative.    Eyes: Negative.    Cardiovascular: Negative.    Respiratory: Negative.    Endocrine: Negative.    Skin: Negative.    Musculoskeletal: Negative.    Gastrointestinal: Negative.    Neurological: Negative.    Psychiatric/Behavioral: Negative.        Procedures      ECG 12 Lead  Date/Time: 11/29/2018 11:14 AM  Performed by: Wes Stone MD  Authorized by: Wes Stone MD   Comparison: compared with previous ECG from 5/9/2018  Rhythm: sinus rhythm  Rate: normal  Conduction: conduction normal  ST Segments: ST segments normal  T Waves: T waves normal  QRS axis: normal                  Objective:    /74   Pulse 60   Ht 185.4 cm (73\")   Wt 88 kg (194 lb)   BMI 25.60 kg/m²         Physical Exam   Constitutional: He is oriented to person, place, and time. He appears well-developed and well-nourished.   HENT:   Head: Normocephalic.   Eyes: Pupils are equal, round, and reactive to light.   Neck: Normal range of motion. No JVD present. Carotid bruit is not present. No thyromegaly present.   Cardiovascular: Normal rate, regular rhythm, S1 normal, S2 normal, normal heart sounds and intact distal pulses. Exam reveals no gallop and no friction rub.   No murmur heard.  Pulmonary/Chest: Effort normal and breath sounds normal.   Abdominal: Soft. Bowel sounds are normal.   Musculoskeletal: He exhibits no edema.   Neurological: He is alert and oriented to person, place, and time.   Skin: Skin is " warm, dry and intact. No erythema.   Psychiatric: He has a normal mood and affect.   Vitals reviewed.          Assessment:       Diagnosis Plan   1. Atrial fibrillation with RVR (CMS/HCC)     2. Essential hypertension         1.  Atrial fibrillation: Paroxysmal.  No recurrence    2.  Hypertension: Controlled    3.  Diabetes mellitus, type II on oral therapy.     Plan:       A cardiac standpoint the patient is stable.  I believe there is no need for him to follow up unless he has recurrent atrial fibrillation.  He will continue to follow with Dr. Mcnally.  He will continue his metoprolol.

## 2020-10-08 ENCOUNTER — HOSPITAL ENCOUNTER (OUTPATIENT)
Dept: CARDIOLOGY | Facility: HOSPITAL | Age: 75
Discharge: HOME | End: 2020-10-08

## 2020-10-08 ENCOUNTER — TRANSCRIBE ORDERS (OUTPATIENT)
Dept: ADMINISTRATIVE | Facility: HOSPITAL | Age: 75
End: 2020-10-08

## 2020-10-08 ENCOUNTER — LAB (OUTPATIENT)
Dept: LAB | Facility: HOSPITAL | Age: 75
End: 2020-10-08

## 2020-10-08 DIAGNOSIS — Z01.818 PRE-OP EXAM: Primary | ICD-10-CM

## 2020-10-08 DIAGNOSIS — Z01.818 PRE-OP EXAM: ICD-10-CM

## 2020-10-08 LAB
ALBUMIN SERPL-MCNC: 4.4 G/DL (ref 3.5–5.2)
ALBUMIN/GLOB SERPL: 2 G/DL
ALP SERPL-CCNC: 39 U/L (ref 39–117)
ALT SERPL W P-5'-P-CCNC: 17 U/L (ref 1–41)
ANION GAP SERPL CALCULATED.3IONS-SCNC: 8.7 MMOL/L (ref 5–15)
AST SERPL-CCNC: 16 U/L (ref 1–40)
BASOPHILS # BLD AUTO: 0.04 10*3/MM3 (ref 0–0.2)
BASOPHILS NFR BLD AUTO: 0.6 % (ref 0–1.5)
BILIRUB SERPL-MCNC: 0.4 MG/DL (ref 0–1.2)
BUN SERPL-MCNC: 21 MG/DL (ref 8–23)
BUN/CREAT SERPL: 13.5 (ref 7–25)
CALCIUM SPEC-SCNC: 9.5 MG/DL (ref 8.6–10.5)
CHLORIDE SERPL-SCNC: 110 MMOL/L (ref 98–107)
CO2 SERPL-SCNC: 25.3 MMOL/L (ref 22–29)
CREAT SERPL-MCNC: 1.56 MG/DL (ref 0.76–1.27)
DEPRECATED RDW RBC AUTO: 41.8 FL (ref 37–54)
EOSINOPHIL # BLD AUTO: 0.23 10*3/MM3 (ref 0–0.4)
EOSINOPHIL NFR BLD AUTO: 3.3 % (ref 0.3–6.2)
ERYTHROCYTE [DISTWIDTH] IN BLOOD BY AUTOMATED COUNT: 12.7 % (ref 12.3–15.4)
GFR SERPL CREATININE-BSD FRML MDRD: 43 ML/MIN/1.73
GLOBULIN UR ELPH-MCNC: 2.2 GM/DL
GLUCOSE SERPL-MCNC: 132 MG/DL (ref 65–99)
HCT VFR BLD AUTO: 43.8 % (ref 37.5–51)
HGB BLD-MCNC: 15 G/DL (ref 13–17.7)
IMM GRANULOCYTES # BLD AUTO: 0.01 10*3/MM3 (ref 0–0.05)
IMM GRANULOCYTES NFR BLD AUTO: 0.1 % (ref 0–0.5)
LYMPHOCYTES # BLD AUTO: 2.27 10*3/MM3 (ref 0.7–3.1)
LYMPHOCYTES NFR BLD AUTO: 32.9 % (ref 19.6–45.3)
MCH RBC QN AUTO: 31.2 PG (ref 26.6–33)
MCHC RBC AUTO-ENTMCNC: 34.2 G/DL (ref 31.5–35.7)
MCV RBC AUTO: 91.1 FL (ref 79–97)
MONOCYTES # BLD AUTO: 0.6 10*3/MM3 (ref 0.1–0.9)
MONOCYTES NFR BLD AUTO: 8.7 % (ref 5–12)
NEUTROPHILS NFR BLD AUTO: 3.75 10*3/MM3 (ref 1.7–7)
NEUTROPHILS NFR BLD AUTO: 54.4 % (ref 42.7–76)
NRBC BLD AUTO-RTO: 0 /100 WBC (ref 0–0.2)
PHOSPHATE SERPL-MCNC: 3.4 MG/DL (ref 2.5–4.5)
PLATELET # BLD AUTO: 175 10*3/MM3 (ref 140–450)
PMV BLD AUTO: 11 FL (ref 6–12)
POTASSIUM SERPL-SCNC: 4.8 MMOL/L (ref 3.5–5.2)
PROT SERPL-MCNC: 6.6 G/DL (ref 6–8.5)
RBC # BLD AUTO: 4.81 10*6/MM3 (ref 4.14–5.8)
SODIUM SERPL-SCNC: 144 MMOL/L (ref 136–145)
WBC # BLD AUTO: 6.9 10*3/MM3 (ref 3.4–10.8)

## 2020-10-08 PROCEDURE — 84100 ASSAY OF PHOSPHORUS: CPT

## 2020-10-08 PROCEDURE — 85025 COMPLETE CBC W/AUTO DIFF WBC: CPT

## 2020-10-08 PROCEDURE — 80053 COMPREHEN METABOLIC PANEL: CPT

## 2020-10-08 PROCEDURE — 36415 COLL VENOUS BLD VENIPUNCTURE: CPT

## 2020-10-08 PROCEDURE — 93010 ELECTROCARDIOGRAM REPORT: CPT | Performed by: INTERNAL MEDICINE

## 2020-10-08 PROCEDURE — 93005 ELECTROCARDIOGRAM TRACING: CPT | Performed by: ORTHOPAEDIC SURGERY

## 2021-03-09 DIAGNOSIS — Z23 IMMUNIZATION DUE: ICD-10-CM

## 2022-03-17 NOTE — PROGRESS NOTES
CM spoke with patient's wife, patient is returning to [facility] [place] [facility]. CM left a voice message with Kacey/ admissions liaison to confirm initiation of the pre cert.   SUPRIYA Block, CCM, RN  [facility]  [phone] Discharge Planning Assessment  [facility]     Patient Name: Paul English  MRN: [id number]  Today's Date: 3/21/2018    Admit Date: 3/21/2018          Discharge Needs Assessment     Row Name 03/21/18 1620       Living Environment    Lives With spouse    Current Living Arrangements home/apartment/condo    Family Caregiver if Needed spouse;child(carrie), adult    Quality of Family Relationships involved;helpful;supportive    Able to Return to Prior Arrangements yes       Resource/Environmental Concerns    Transportation Concerns car, none       Transition Planning    Patient/Family Anticipates Transition to home with family    Transportation Anticipated family or friend will provide       Discharge Needs Assessment    Readmission Within the Last 30 Days current reason for admission unrelated to previous admission    Concerns to be Addressed denies needs/concerns at this time;no discharge needs identified    Equipment Currently Used at Home none    Equipment Needed After Discharge none            Discharge Plan     Row Name 03/21/18 1622       Plan    Plan Home with spouse    Patient/Family in Agreement with Plan yes    Plan Comments Spoke with pt, pts spouse-Lucina and family at bedside. Facesheet info verified. Role of CCP explained. Pharmacy verified. pt has never used  or been to [facility]. Pt lives in a  home with a ramp to enter his house. Pt plans to return home with his spouse upon dc and they deny any discharge planning needs at this time.  CCP to follow.        Destination     No service coordination in this encounter.      Durable Medical Equipment     No service coordination in this encounter.      Dialysis/Infusion     No service coordination in this encounter.      Home Medical Care     No service coordination in this encounter.      Social Care     No service coordination in this encounter.                Demographic Summary    No documentation.           Functional Status    No documentation.            Psychosocial    No documentation.           Abuse/Neglect    No documentation.           Legal    No documentation.           Substance Abuse    No documentation.           Patient Forms    No documentation.         Alison Cherry RN

## 2022-05-05 ENCOUNTER — TRANSCRIBE ORDERS (OUTPATIENT)
Dept: ADMINISTRATIVE | Facility: HOSPITAL | Age: 77
End: 2022-05-05

## 2022-05-05 DIAGNOSIS — R79.89 ELEVATED SERUM CREATININE: Primary | ICD-10-CM

## 2022-05-11 ENCOUNTER — HOSPITAL ENCOUNTER (OUTPATIENT)
Dept: ULTRASOUND IMAGING | Facility: HOSPITAL | Age: 77
Discharge: HOME | End: 2022-05-11
Admitting: HOSPITALIST

## 2022-05-11 DIAGNOSIS — R79.89 ELEVATED SERUM CREATININE: ICD-10-CM

## 2022-05-11 PROCEDURE — 76775 US EXAM ABDO BACK WALL LIM: CPT

## 2022-05-24 ENCOUNTER — TRANSCRIBE ORDERS (OUTPATIENT)
Dept: ADMINISTRATIVE | Facility: HOSPITAL | Age: 77
End: 2022-05-24

## 2022-05-24 DIAGNOSIS — N28.89 RENAL MASS: Primary | ICD-10-CM

## 2022-05-26 ENCOUNTER — HOSPITAL ENCOUNTER (OUTPATIENT)
Dept: CT IMAGING | Facility: HOSPITAL | Age: 77
Discharge: HOME | End: 2022-05-26
Admitting: HOSPITALIST

## 2022-05-26 DIAGNOSIS — N28.89 RENAL MASS: ICD-10-CM

## 2022-05-26 PROCEDURE — 74176 CT ABD & PELVIS W/O CONTRAST: CPT

## 2022-08-02 ENCOUNTER — APPOINTMENT (OUTPATIENT)
Dept: GENERAL RADIOLOGY | Facility: HOSPITAL | Age: 77
End: 2022-08-02

## 2022-08-02 ENCOUNTER — HOSPITAL ENCOUNTER (EMERGENCY)
Facility: HOSPITAL | Age: 77
Discharge: HOME | End: 2022-08-02
Attending: EMERGENCY MEDICINE | Admitting: EMERGENCY MEDICINE

## 2022-08-02 VITALS
HEIGHT: 73 IN | HEART RATE: 49 BPM | BODY MASS INDEX: 25.84 KG/M2 | RESPIRATION RATE: 14 BRPM | WEIGHT: 195 LBS | TEMPERATURE: 97.8 F | OXYGEN SATURATION: 98 % | SYSTOLIC BLOOD PRESSURE: 116 MMHG | DIASTOLIC BLOOD PRESSURE: 73 MMHG

## 2022-08-02 DIAGNOSIS — I48.91 ATRIAL FIBRILLATION WITH RVR: Primary | ICD-10-CM

## 2022-08-02 PROBLEM — I48.0 PAROXYSMAL ATRIAL FIBRILLATION (HCC): Status: ACTIVE | Noted: 2022-08-02

## 2022-08-02 LAB
ALBUMIN SERPL-MCNC: 4.2 G/DL (ref 3.5–5.2)
ALBUMIN/GLOB SERPL: 2.2 G/DL
ALP SERPL-CCNC: 40 U/L (ref 39–117)
ALT SERPL W P-5'-P-CCNC: 19 U/L (ref 1–41)
ANION GAP SERPL CALCULATED.3IONS-SCNC: 7.9 MMOL/L (ref 5–15)
AST SERPL-CCNC: 16 U/L (ref 1–40)
BASOPHILS # BLD AUTO: 0.05 10*3/MM3 (ref 0–0.2)
BASOPHILS NFR BLD AUTO: 0.5 % (ref 0–1.5)
BILIRUB SERPL-MCNC: 0.5 MG/DL (ref 0–1.2)
BUN SERPL-MCNC: 33 MG/DL (ref 8–23)
BUN/CREAT SERPL: 19.4 (ref 7–25)
CALCIUM SPEC-SCNC: 9.3 MG/DL (ref 8.6–10.5)
CHLORIDE SERPL-SCNC: 107 MMOL/L (ref 98–107)
CO2 SERPL-SCNC: 25.1 MMOL/L (ref 22–29)
CREAT SERPL-MCNC: 1.7 MG/DL (ref 0.76–1.27)
DEPRECATED RDW RBC AUTO: 44.9 FL (ref 37–54)
EGFRCR SERPLBLD CKD-EPI 2021: 41 ML/MIN/1.73
EOSINOPHIL # BLD AUTO: 0.18 10*3/MM3 (ref 0–0.4)
EOSINOPHIL NFR BLD AUTO: 1.8 % (ref 0.3–6.2)
ERYTHROCYTE [DISTWIDTH] IN BLOOD BY AUTOMATED COUNT: 13 % (ref 12.3–15.4)
GLOBULIN UR ELPH-MCNC: 1.9 GM/DL
GLUCOSE SERPL-MCNC: 180 MG/DL (ref 65–99)
HCT VFR BLD AUTO: 47.7 % (ref 37.5–51)
HGB BLD-MCNC: 15.7 G/DL (ref 13–17.7)
IMM GRANULOCYTES # BLD AUTO: 0.02 10*3/MM3 (ref 0–0.05)
IMM GRANULOCYTES NFR BLD AUTO: 0.2 % (ref 0–0.5)
LYMPHOCYTES # BLD AUTO: 2.46 10*3/MM3 (ref 0.7–3.1)
LYMPHOCYTES NFR BLD AUTO: 25.2 % (ref 19.6–45.3)
MAGNESIUM SERPL-MCNC: 2.1 MG/DL (ref 1.6–2.4)
MCH RBC QN AUTO: 31.1 PG (ref 26.6–33)
MCHC RBC AUTO-ENTMCNC: 32.9 G/DL (ref 31.5–35.7)
MCV RBC AUTO: 94.5 FL (ref 79–97)
MONOCYTES # BLD AUTO: 0.96 10*3/MM3 (ref 0.1–0.9)
MONOCYTES NFR BLD AUTO: 9.8 % (ref 5–12)
NEUTROPHILS NFR BLD AUTO: 6.1 10*3/MM3 (ref 1.7–7)
NEUTROPHILS NFR BLD AUTO: 62.5 % (ref 42.7–76)
NRBC BLD AUTO-RTO: 0 /100 WBC (ref 0–0.2)
PLATELET # BLD AUTO: 167 10*3/MM3 (ref 140–450)
PMV BLD AUTO: 10.9 FL (ref 6–12)
POTASSIUM SERPL-SCNC: 4.5 MMOL/L (ref 3.5–5.2)
PROT SERPL-MCNC: 6.1 G/DL (ref 6–8.5)
QT INTERVAL: 333 MS
QT INTERVAL: 425 MS
RBC # BLD AUTO: 5.05 10*6/MM3 (ref 4.14–5.8)
SODIUM SERPL-SCNC: 140 MMOL/L (ref 136–145)
TROPONIN T SERPL-MCNC: <0.01 NG/ML (ref 0–0.03)
TSH SERPL DL<=0.05 MIU/L-ACNC: 2.72 UIU/ML (ref 0.27–4.2)
WBC NRBC COR # BLD: 9.77 10*3/MM3 (ref 3.4–10.8)

## 2022-08-02 PROCEDURE — 99284 EMERGENCY DEPT VISIT MOD MDM: CPT

## 2022-08-02 PROCEDURE — 93005 ELECTROCARDIOGRAM TRACING: CPT

## 2022-08-02 PROCEDURE — 80053 COMPREHEN METABOLIC PANEL: CPT | Performed by: PHYSICIAN ASSISTANT

## 2022-08-02 PROCEDURE — 93005 ELECTROCARDIOGRAM TRACING: CPT | Performed by: EMERGENCY MEDICINE

## 2022-08-02 PROCEDURE — 93010 ELECTROCARDIOGRAM REPORT: CPT | Performed by: INTERNAL MEDICINE

## 2022-08-02 PROCEDURE — 71045 X-RAY EXAM CHEST 1 VIEW: CPT

## 2022-08-02 PROCEDURE — 85025 COMPLETE CBC W/AUTO DIFF WBC: CPT | Performed by: PHYSICIAN ASSISTANT

## 2022-08-02 PROCEDURE — 25010000002 DIGOXIN PER 500 MCG: Performed by: EMERGENCY MEDICINE

## 2022-08-02 PROCEDURE — 96374 THER/PROPH/DIAG INJ IV PUSH: CPT

## 2022-08-02 PROCEDURE — 84484 ASSAY OF TROPONIN QUANT: CPT | Performed by: PHYSICIAN ASSISTANT

## 2022-08-02 PROCEDURE — 96375 TX/PRO/DX INJ NEW DRUG ADDON: CPT

## 2022-08-02 PROCEDURE — 83735 ASSAY OF MAGNESIUM: CPT | Performed by: PHYSICIAN ASSISTANT

## 2022-08-02 PROCEDURE — 84443 ASSAY THYROID STIM HORMONE: CPT | Performed by: PHYSICIAN ASSISTANT

## 2022-08-02 RX ORDER — METOPROLOL SUCCINATE 25 MG/1
50 TABLET, EXTENDED RELEASE ORAL DAILY
Qty: 90 TABLET | Refills: 0 | Status: SHIPPED | OUTPATIENT
Start: 2022-08-02 | End: 2022-11-04

## 2022-08-02 RX ORDER — DIGOXIN 0.25 MG/ML
500 INJECTION INTRAMUSCULAR; INTRAVENOUS ONCE
Status: COMPLETED | OUTPATIENT
Start: 2022-08-02 | End: 2022-08-02

## 2022-08-02 RX ORDER — SODIUM CHLORIDE 0.9 % (FLUSH) 0.9 %
10 SYRINGE (ML) INJECTION AS NEEDED
Status: DISCONTINUED | OUTPATIENT
Start: 2022-08-02 | End: 2022-08-02 | Stop reason: HOSPADM

## 2022-08-02 RX ADMIN — SODIUM CHLORIDE 500 ML: 9 INJECTION, SOLUTION INTRAVENOUS at 13:52

## 2022-08-02 RX ADMIN — RIVAROXABAN 15 MG: 15 TABLET, FILM COATED ORAL at 13:51

## 2022-08-02 RX ADMIN — METOPROLOL TARTRATE 5 MG: 1 INJECTION, SOLUTION INTRAVENOUS at 11:55

## 2022-08-02 RX ADMIN — DIGOXIN 500 MCG: 250 INJECTION, SOLUTION INTRAMUSCULAR; INTRAVENOUS at 13:54

## 2022-08-02 NOTE — ED TRIAGE NOTES
Pt has irregular HR and was diaphoretic this am - he us not sweating now.  He is dizzy.  He had afib once after a surgery.  He takes metoprolol.  .    Patient was placed in face mask during first look triage.  Patient was wearing a face mask throughout encounter.  I wore personal protective equipment throughout the encounter.  Hand hygiene was performed before and after patient encounter.

## 2022-08-02 NOTE — DISCHARGE INSTRUCTIONS
Return here immediately if you have any trauma especially to your head.  Take your medications as prescribed.  Note the changes.  Dr. Mccracken's office will call you to make an appointment.  If you do not hear from them give them a call.  Return here immediately for any chest pain, shortness of breath, or passing out.

## 2022-08-02 NOTE — ED PROVIDER NOTES
EMERGENCY DEPARTMENT ENCOUNTER    Room Number:  21/21  Date of encounter:  8/2/2022  PCP: Maria Luz Mcnally MD  Patient Care Team:  Maria Luz Mcnally MD as PCP - General (Internal Medicine)   Historian: Patient, family    HPI:  Chief Complaint: Palpitations  A complete HPI/ROS/PMH/PSH/SH/FH are unobtainable due to: Nothing    Context: Paul English is a 76 y.o. male who presents to the ED c/o developing palpitations this morning.  He reports that he was outside pulling weeds when he suddenly felt lightheaded and felt palpitations.  He reports that he was diaphoretic.  He reports he has improved since then with rest.  He states that at one time in the past about 4 years ago when he had a nephrectomy he had a period of atrial fibrillation that resolved.  He states he does not currently have a cardiologist.  He states he would like to see Dr. Anaya Mccracken.  He states he has not had any episodes of atrial fibrillation since that time.  He denies chest pain.  He denies shortness of breath.    Prior record review: Cardiology note by Dr. Stone 11/29/2018 for atrial fibrillation.  He was not placed on blood thinners at the time.  He was placed on beta-blockers.    PAST MEDICAL HISTORY  Active Ambulatory Problems     Diagnosis Date Noted   • Essential hypertension 03/21/2018   • Bladder cancer (HCC) 03/21/2018   • Stage 3 chronic kidney disease (HCC) 03/21/2018   • Ureter cancer (HCC) 03/21/2018   • Thrombocytopenia (HCC) 03/22/2018   • Paroxysmal atrial fibrillation (HCC) 08/02/2022     Resolved Ambulatory Problems     Diagnosis Date Noted   • No Resolved Ambulatory Problems     Past Medical History:   Diagnosis Date   • Hyperlipidemia    • Hypertension    • Type 2 diabetes mellitus (HCC)        The patient has started, but not completed, their COVID-19 vaccination series.    PAST SURGICAL HISTORY  Past Surgical History:   Procedure Laterality Date   • BLADDER SURGERY     • FOOT SURGERY      RIGHT FOOT          FAMILY HISTORY  History reviewed. No pertinent family history.      SOCIAL HISTORY  Social History     Socioeconomic History   • Marital status:    Tobacco Use   • Smoking status: Never Smoker   Substance and Sexual Activity   • Alcohol use: No   • Drug use: No         ALLERGIES  Chlorhexidine        REVIEW OF SYSTEMS  Review of Systems   No chest pain, no shortness of breath, no nausea, no vomiting, no fever, no chills, no headache, no focal weakness, no focal numbness  All systems reviewed and negative except for those discussed in HPI.       PHYSICAL EXAM    I have reviewed the triage vital signs and nursing notes.    ED Triage Vitals   Temp Heart Rate Resp BP SpO2   08/02/22 1023 08/02/22 1023 08/02/22 1023 08/02/22 1028 08/02/22 1023   97.8 °F (36.6 °C) 68 16 (!) 153/113 98 %      Temp src Heart Rate Source Patient Position BP Location FiO2 (%)   08/02/22 1023 08/02/22 1023 08/02/22 1119 08/02/22 1119 --   Tympanic Monitor Lying Left arm        Physical Exam  GENERAL: Awake, alert, no acute distress  SKIN: Warm, dry  HENT: Normocephalic, atraumatic  EYES: no scleral icterus  CV: irregular rhythm, tachycardic rate  RESPIRATORY: normal effort, lungs clear  ABDOMEN: soft, nontender, nondistended  MUSCULOSKELETAL: no deformity  NEURO: alert, moves all extremities, follows commands          LAB RESULTS  Recent Results (from the past 24 hour(s))   ECG 12 Lead    Collection Time: 08/02/22 10:46 AM   Result Value Ref Range    QT Interval 333 ms   Comprehensive Metabolic Panel    Collection Time: 08/02/22 10:57 AM    Specimen: Blood   Result Value Ref Range    Glucose 180 (H) 65 - 99 mg/dL    BUN 33 (H) 8 - 23 mg/dL    Creatinine 1.70 (H) 0.76 - 1.27 mg/dL    Sodium 140 136 - 145 mmol/L    Potassium 4.5 3.5 - 5.2 mmol/L    Chloride 107 98 - 107 mmol/L    CO2 25.1 22.0 - 29.0 mmol/L    Calcium 9.3 8.6 - 10.5 mg/dL    Total Protein 6.1 6.0 - 8.5 g/dL    Albumin 4.20 3.50 - 5.20 g/dL    ALT (SGPT) 19 1 - 41  U/L    AST (SGOT) 16 1 - 40 U/L    Alkaline Phosphatase 40 39 - 117 U/L    Total Bilirubin 0.5 0.0 - 1.2 mg/dL    Globulin 1.9 gm/dL    A/G Ratio 2.2 g/dL    BUN/Creatinine Ratio 19.4 7.0 - 25.0    Anion Gap 7.9 5.0 - 15.0 mmol/L    eGFR 41.0 (L) >60.0 mL/min/1.73   Troponin    Collection Time: 08/02/22 10:57 AM    Specimen: Blood   Result Value Ref Range    Troponin T <0.010 0.000 - 0.030 ng/mL   Magnesium    Collection Time: 08/02/22 10:57 AM    Specimen: Blood   Result Value Ref Range    Magnesium 2.1 1.6 - 2.4 mg/dL   TSH    Collection Time: 08/02/22 10:57 AM    Specimen: Blood   Result Value Ref Range    TSH 2.720 0.270 - 4.200 uIU/mL   CBC Auto Differential    Collection Time: 08/02/22 10:57 AM    Specimen: Blood   Result Value Ref Range    WBC 9.77 3.40 - 10.80 10*3/mm3    RBC 5.05 4.14 - 5.80 10*6/mm3    Hemoglobin 15.7 13.0 - 17.7 g/dL    Hematocrit 47.7 37.5 - 51.0 %    MCV 94.5 79.0 - 97.0 fL    MCH 31.1 26.6 - 33.0 pg    MCHC 32.9 31.5 - 35.7 g/dL    RDW 13.0 12.3 - 15.4 %    RDW-SD 44.9 37.0 - 54.0 fl    MPV 10.9 6.0 - 12.0 fL    Platelets 167 140 - 450 10*3/mm3    Neutrophil % 62.5 42.7 - 76.0 %    Lymphocyte % 25.2 19.6 - 45.3 %    Monocyte % 9.8 5.0 - 12.0 %    Eosinophil % 1.8 0.3 - 6.2 %    Basophil % 0.5 0.0 - 1.5 %    Immature Grans % 0.2 0.0 - 0.5 %    Neutrophils, Absolute 6.10 1.70 - 7.00 10*3/mm3    Lymphocytes, Absolute 2.46 0.70 - 3.10 10*3/mm3    Monocytes, Absolute 0.96 (H) 0.10 - 0.90 10*3/mm3    Eosinophils, Absolute 0.18 0.00 - 0.40 10*3/mm3    Basophils, Absolute 0.05 0.00 - 0.20 10*3/mm3    Immature Grans, Absolute 0.02 0.00 - 0.05 10*3/mm3    nRBC 0.0 0.0 - 0.2 /100 WBC   ECG 12 Lead    Collection Time: 08/02/22  2:57 PM   Result Value Ref Range    QT Interval 425 ms       Ordered the above labs and independently reviewed the results.        RADIOLOGY  XR Chest 1 View    Result Date: 8/2/2022  XR CHEST 1 VW-  08/02/2022  HISTORY: Dizziness.  Heart size is within normal limits. Lungs  appear free of acute infiltrates. Small amount of aortic calcification is seen. Bones and soft tissues are otherwise unremarkable.      1. No acute process.  This report was finalized on 8/2/2022 11:25 AM by Dr. Clark Reyes M.D.        I ordered the above noted radiological studies. Reviewed by me and discussed with radiologist.  See dictation for official radiology interpretation.      PROCEDURES    Critical Care  Performed by: Davis Muñoz MD  Authorized by: Davis Muñoz MD     Critical care provider statement:     Critical care time (minutes): 30-74.    Critical care time was exclusive of:  Separately billable procedures and treating other patients    Critical care was necessary to treat or prevent imminent or life-threatening deterioration of the following conditions:  Cardiac failure    Critical care was time spent personally by me on the following activities:  Development of treatment plan with patient or surrogate, discussions with consultants, evaluation of patient's response to treatment, examination of patient, obtaining history from patient or surrogate, ordering and performing treatments and interventions, ordering and review of laboratory studies, ordering and review of radiographic studies, pulse oximetry, re-evaluation of patient's condition and review of old charts          MEDICATIONS GIVEN IN ER    Medications   sodium chloride 0.9 % flush 10 mL (has no administration in time range)   metoprolol tartrate (LOPRESSOR) injection 5 mg (5 mg Intravenous Given 8/2/22 1155)   rivaroxaban (XARELTO) tablet 15 mg (15 mg Oral Given 8/2/22 1351)   digoxin (LANOXIN) injection 500 mcg (500 mcg Intravenous Given 8/2/22 1354)   sodium chloride 0.9 % bolus 500 mL (0 mL Intravenous Stopped 8/2/22 1431)         PROGRESS, DATA ANALYSIS, CONSULTS, AND MEDICAL DECISION MAKING    All labs have been independently reviewed by me.  All radiology studies have been reviewed by me and discussed with radiologist  dictating the report.   EKG's independently viewed and interpreted by me.  Discussion below represents my analysis of pertinent findings related to patient's condition, differential diagnosis, treatment plan and final disposition.    Differential diagnosis includes but is not limited to atrial fibrillation, atrial fibrillation with rapid ventricular response, low magnesium, low potassium, dehydration.    ED Course as of 08/02/22 1521   Tue Aug 02, 2022   1135 Lash [TR]   1141 EKG          EKG time: 1046  Rhythm/Rate: Atrial fibrillation, rate 134  P waves and [place]: Not visualized  QRS, axis: Narrow QRS, normal axis  ST and T waves: Normal ST and T waves    Interpreted Contemporaneously by me, independently viewed  New atrial fibrillation changed compared to prior 11/8/2020   [TR]   1142 Magnesium: 2.1 [TR]   1142 BUN(!): 33 [TR]   1142 Creatinine(!): 1.70 [TR]   1142 Potassium: 4.5 [TR]   1220 The patient has some hypotension after the first dose of metoprolol. [TR]   1246 Blood pressure is 107 systolic.  Heart rate is 105.  Call placed to cardiology to discuss. [TR]   1318 Speaking with Dr. Velazquez with cardiology.  She request a dose of IV digoxin 0.5 mg IV now.  She wants to start Xarelto 15 mg daily.  She wants to increase his Lopressor to 50 mg and to stop the lisinopril.  They will see him in the office. [TR]   1332 I reviewed work-up and findings with the patient and family at the bedside.  He is blood pressure has dipped again.  Starting some IV fluid bolus.  Will reevaluate.  I educated him about blood thinners and the risk and need to return here immediately if he has any trauma.  I talked to him about medication changes.  He is agreeable. [TR]   1419 His heart rate is still controlled but his blood pressure remains soft.  He is getting IV fluids now. [TR]   1506 EKG          EKG time: 1457  Rhythm/Rate: Sinus bradycardia, rate 48  P waves and NY: Normal P, normal NY  QRS, axis: Narrow QRS, normal axis  ST and  T waves: Normal ST and T waves    Interpreted Contemporaneously by me, independently viewed  Resolved atrial fibrillation changed compared to prior earlier today   [TR]   1521 He is converted out of A. fib.  His blood pressure is consistently over 115.  He wants to go home.  Plan discharge home. [TR]      ED Course User Index  [TR] Davis Muñoz MD           PPE: The patient wore a mask and I wore an N95 mask throughout the entire patient encounter.       AS OF 15:21 EDT VITALS:    BP - 116/90  HR - 51  TEMP - 97.8 °F (36.6 °C) (Tympanic)  O2 SATS - 99%        DIAGNOSIS  Final diagnoses:   Atrial fibrillation with RVR (HCC)         DISPOSITION  ED Disposition     ED Disposition   Discharge    Condition   Stable    Comment   --                   Davis Muñoz MD  08/02/22 [id number]

## 2022-08-26 ENCOUNTER — TELEPHONE (OUTPATIENT)
Dept: CARDIOLOGY | Facility: CLINIC | Age: 77
End: 2022-08-26

## 2022-08-26 NOTE — TELEPHONE ENCOUNTER
Ok to hold Xarelto  due to active bleeding. Keep appt with Dr. Mccracken, continue metoprolol.  Also needs to contact PCP or urology. Ok to also notify nephrology

## 2022-08-26 NOTE — TELEPHONE ENCOUNTER
Reviewed recommendations with Paul Verdugo's spouse.  Lucina verbalized understanding of the recommendations.    Thank you,  Tanya Bautista RN  Triage Nurse [facility] [place] [facility]

## 2022-08-26 NOTE — TELEPHONE ENCOUNTER
"Paul English's spouse, Lucina called to report blood in patient's urine.  Patient was recently seen in ED for afib with rvr and Dr. Velazquez provided guidance on treatment.  Patient has not been seen since 2018 and has an appointment with Dr. Mccracken on 9/19.    Lucina stated patient urine was pinkish-red yesterday afternoon and has now turned dark brownish in color.  Per Lucina, patient stated he \"feels ok\"    /100 (initial)  /90 (on recheck), HR 57, Oxygen sat 98%    Cardiac Meds  Metoprolol succinate XL 25 mg, 2 tablets daily  (has not taken yet today)  Rivaroxaban 15 mg, 1 tablet daily    Lucina stated she has not contacted nephrology yet because she thinks they will want cardiology's input first.      Please let me know how you would like to proceed.    Thank you,  Tanya Bautista, RN  Triage Nurse [facility]IS ENRIQUEG  "

## 2022-09-19 ENCOUNTER — OFFICE VISIT (OUTPATIENT)
Dept: CARDIOLOGY | Facility: CLINIC | Age: 77
End: 2022-09-19

## 2022-09-19 VITALS
HEIGHT: 73 IN | BODY MASS INDEX: 25.21 KG/M2 | HEART RATE: 50 BPM | SYSTOLIC BLOOD PRESSURE: 152 MMHG | DIASTOLIC BLOOD PRESSURE: 90 MMHG | WEIGHT: 190.2 LBS

## 2022-09-19 DIAGNOSIS — I48.0 PAROXYSMAL ATRIAL FIBRILLATION: Primary | ICD-10-CM

## 2022-09-19 DIAGNOSIS — I10 ESSENTIAL HYPERTENSION: ICD-10-CM

## 2022-09-19 PROCEDURE — 93000 ELECTROCARDIOGRAM COMPLETE: CPT | Performed by: INTERNAL MEDICINE

## 2022-09-19 PROCEDURE — 99204 OFFICE O/P NEW MOD 45 MIN: CPT | Performed by: INTERNAL MEDICINE

## 2022-09-19 NOTE — PROGRESS NOTES
"      CARDIOLOGY    Alison Mccracken MD    ENCOUNTER DATE:  09/19/2022    Paul English / 77 y.o. / male        CHIEF COMPLAINT / REASON FOR OFFICE VISIT     Atrial Fibrillation      HISTORY OF PRESENT ILLNESS       HPI    Paul English is a 77 y.o. male     This is a patient who previously followed with Dr. Stone.  He has paroxysmal atrial fibrillation.  He had an echo in March 2018 which showed a sigmoid septum but otherwise normal LV function and no significant valve disease.  He was seen in the emergency room on August 2 with an episode of palpitations and diaphoresis.  Original EKG showed atrial fibrillation with rapid ventricular response.  Repeat EKG showed sinus bradycardia.    He has not had further episodes of palpitations.  That he knows of he is only had atrial fibrillation twice.  He has had some blood in his urine and has plans for a cystoscopy next week.    REVIEW OF SYSTEMS     Review of Systems   Constitutional: Negative for chills, fever, weight gain and weight loss.   Cardiovascular: Negative for leg swelling.   Respiratory: Negative for cough, snoring and wheezing.    Hematologic/Lymphatic: Negative for bleeding problem. Does not bruise/bleed easily.   Skin: Negative for color change.   Musculoskeletal: Positive for joint pain. Negative for falls and myalgias.   Gastrointestinal: Negative for melena.   Genitourinary: Negative for hematuria.   Neurological: Negative for excessive daytime sleepiness.   Psychiatric/Behavioral: Negative for depression. The patient is not nervous/anxious.          VITAL SIGNS     Visit Vitals  /90 (BP Location: Right arm)   Pulse 50   Ht 185.4 cm (73\")   Wt 86.3 kg (190 lb 3.2 oz)   BMI 25.09 kg/m²         Wt Readings from Last 3 Encounters:   09/19/22 86.3 kg (190 lb 3.2 oz)   08/02/22 88.5 kg (195 lb)   11/29/18 88 kg (194 lb)     Body mass index is 25.09 kg/m².      PHYSICAL EXAMINATION     Constitutional:       General: Not in acute " distress.  Neck:      Vascular: No carotid bruit or JVD.   Pulmonary:      Effort: Pulmonary effort is normal.      Breath sounds: Normal breath sounds.   Cardiovascular:      Normal rate. Regular rhythm.      Murmurs: There is no murmur.   Psychiatric:         Mood and Affect: Mood and affect normal.           REVIEWED DATA       ECG 12 Lead    Date/Time: 9/19/2022 1:14 PM  Performed by: Alison Mccracken MD  Authorized by: Alison Mccracken MD   Comparison: compared with previous ECG from 8/2/2022  Similar to previous ECG  Rhythm: sinus bradycardia  BPM: 50  Conduction: conduction normal  ST Segments: ST segments normal  T Waves: T waves normal    Clinical impression: normal ECG                  Lab Results   Component Value Date    GLUCOSE 180 (H) 08/02/2022    BUN 33 (H) 08/02/2022    CREATININE 1.70 (H) 08/02/2022    EGFRIFNONA 43 (L) 10/08/2020    BCR 19.4 08/02/2022    K 4.5 08/02/2022    CO2 25.1 08/02/2022    CALCIUM 9.3 08/02/2022    ALBUMIN 4.20 08/02/2022    LABIL2 1.3 12/10/2019    AST 16 08/02/2022    ALT 19 08/02/2022       ASSESSMENT & PLAN      Diagnosis Plan   1. Paroxysmal atrial fibrillation (HCC)  Adult Transthoracic Echo Complete W/ Cont if Necessary Per Protocol   2. Essential hypertension  Adult Transthoracic Echo Complete W/ Cont if Necessary Per Protocol       1. Paroxysmal atrial fibrillation. Symptomatic. Only two episodes that he is aware of. Continue current dose of metoprolol. When he is in sinus rhythm, he is borderline bradycardic, so I do not want to increase it. I told him if he has more issues with palpitations though, we could certainly consider an antiarrhythmic or an ablation. I would like him to be on anticoagulation. Right now, he is having some blood in his urine and is scheduled for a cystoscopy. Based on the results of this cystoscopy will determine when we start him on anticoagulation. He has not had an echo since 2018, so I do want to repeat an echo to make sure he does  not have any valvular heart disease or LV dysfunction that could be triggering his atrial fibrillation.  2. Hypertension. Controlled.     I am going to have him follow up with Tricia in 6 weeks just to make sure that we can touch base on the results of his cystoscopy and anticoagulation, as well as his symptoms and heart rate. If everything seems to be going well and he is back on anticoagulation without any issues, then he can follow up with me in 6 months or so.         Orders Placed This Encounter   Procedures   • ECG 12 Lead     This order was created via procedure documentation     Order Specific Question:   Release to patient     Answer:   Routine Release   • Adult Transthoracic Echo Complete W/ Cont if Necessary Per Protocol     Standing Status:   Future     Standing Expiration Date:   9/19/2023     Order Specific Question:   Reason for exam?     Answer:   Arrhythmia     Order Specific Question:   Arrhythmias specification?     Answer:   AFib           MEDICATIONS         Discharge Medications          Accurate as of September 19, 2022  1:16 PM. If you have any questions, ask your nurse or doctor.            Continue These Medications      Instructions Start Date   glipizide 10 MG tablet  Commonly known as: GLUCOTROL   10 mg, Oral, 2 Times Daily Before Meals      metoprolol succinate XL 25 MG 24 hr tablet  Commonly known as: TOPROL-XL   50 mg, Oral, Daily      OCUVITE ADULT 50+ PO   Oral      rivaroxaban 15 MG tablet  Commonly known as: XARELTO   15 mg, Oral, Daily      rosuvastatin 5 MG tablet  Commonly known as: CRESTOR   5 mg, Oral, Daily      Vitamin D3 50 MCG (2000 UT) capsule   2,000 Units, Oral               Alison Mccracken MD  09/19/22  13:16 EDT    **Dragon Disclaimer:   Much of this encounter note is an electronic transcription/translation of spoken language to printed text. The electronic translation of spoken language may permit erroneous, or at times, nonsensical words or phrases to be  inadvertently transcribed. Although I have reviewed the note for such errors, some may still exist.

## 2022-10-01 ENCOUNTER — PATIENT MESSAGE (OUTPATIENT)
Dept: CARDIOLOGY | Facility: CLINIC | Age: 77
End: 2022-10-01

## 2022-10-03 NOTE — TELEPHONE ENCOUNTER
From: Paul English  To: Alison Mccracken MD  Sent: 10/1/2022 12:37 PM EDT  Subject: Perscription    I just have a week left on my prescription of XARELTO 15 MG tablets. If possible could I have a 90 day refill called to [facility] [address] [place] [address]  [phone]. This will provide me with enough medication until my appointment in 3 months with Dr. Jones. At that time a will have my prescription scheduled through [facility] on line pharmacy. Thank you.

## 2022-11-04 ENCOUNTER — TELEPHONE (OUTPATIENT)
Dept: CARDIOLOGY | Facility: CLINIC | Age: 77
End: 2022-11-04

## 2022-11-04 ENCOUNTER — HOSPITAL ENCOUNTER (OUTPATIENT)
Dept: CARDIOLOGY | Facility: HOSPITAL | Age: 77
Discharge: HOME | End: 2022-11-04

## 2022-11-04 ENCOUNTER — OFFICE VISIT (OUTPATIENT)
Dept: CARDIOLOGY | Facility: CLINIC | Age: 77
End: 2022-11-04

## 2022-11-04 VITALS
BODY MASS INDEX: 24.78 KG/M2 | DIASTOLIC BLOOD PRESSURE: 70 MMHG | WEIGHT: 187 LBS | HEIGHT: 73 IN | HEART RATE: 137 BPM | SYSTOLIC BLOOD PRESSURE: 108 MMHG

## 2022-11-04 VITALS — HEART RATE: 144 BPM | SYSTOLIC BLOOD PRESSURE: 104 MMHG | DIASTOLIC BLOOD PRESSURE: 71 MMHG

## 2022-11-04 VITALS
HEIGHT: 73 IN | WEIGHT: 190 LBS | HEART RATE: 78 BPM | OXYGEN SATURATION: 96 % | BODY MASS INDEX: 25.18 KG/M2 | SYSTOLIC BLOOD PRESSURE: 110 MMHG | DIASTOLIC BLOOD PRESSURE: 80 MMHG

## 2022-11-04 DIAGNOSIS — I73.9 CLAUDICATION OF BOTH LOWER EXTREMITIES: ICD-10-CM

## 2022-11-04 DIAGNOSIS — I10 ESSENTIAL HYPERTENSION: ICD-10-CM

## 2022-11-04 DIAGNOSIS — I48.0 PAROXYSMAL ATRIAL FIBRILLATION: Primary | ICD-10-CM

## 2022-11-04 DIAGNOSIS — R06.81 WITNESSED EPISODE OF APNEA: ICD-10-CM

## 2022-11-04 DIAGNOSIS — G47.10 HYPERSOMNIA: ICD-10-CM

## 2022-11-04 DIAGNOSIS — I48.0 PAROXYSMAL ATRIAL FIBRILLATION: ICD-10-CM

## 2022-11-04 DIAGNOSIS — I47.1 PAROXYSMAL SVT (SUPRAVENTRICULAR TACHYCARDIA): ICD-10-CM

## 2022-11-04 DIAGNOSIS — M79.605 LEG PAIN, BILATERAL: ICD-10-CM

## 2022-11-04 DIAGNOSIS — R06.83 SNORING: ICD-10-CM

## 2022-11-04 DIAGNOSIS — M79.604 LEG PAIN, BILATERAL: ICD-10-CM

## 2022-11-04 LAB
AORTIC ARCH: 2.7 CM
ASCENDING AORTA: 3.2 CM
BH CV ECHO MEAS - ACS: 2 CM
BH CV ECHO MEAS - AI P1/2T: 377.7 MSEC
BH CV ECHO MEAS - AO MAX PG: 5.4 MMHG
BH CV ECHO MEAS - AO MEAN PG: 2.9 MMHG
BH CV ECHO MEAS - AO ROOT DIAM: 3.4 CM
BH CV ECHO MEAS - AO V2 MAX: 115.7 CM/SEC
BH CV ECHO MEAS - AO V2 VTI: 13.9 CM
BH CV ECHO MEAS - AVA(I,D): 2.8 CM2
BH CV ECHO MEAS - EDV(CUBED): 63.3 ML
BH CV ECHO MEAS - EDV(MOD-SP2): 76 ML
BH CV ECHO MEAS - EDV(MOD-SP4): 74 ML
BH CV ECHO MEAS - EF(MOD-BP): 58.2 %
BH CV ECHO MEAS - EF(MOD-SP2): 60.5 %
BH CV ECHO MEAS - EF(MOD-SP4): 59.5 %
BH CV ECHO MEAS - ESV(CUBED): 17 ML
BH CV ECHO MEAS - ESV(MOD-SP2): 30 ML
BH CV ECHO MEAS - ESV(MOD-SP4): 30 ML
BH CV ECHO MEAS - FS: 35.5 %
BH CV ECHO MEAS - IVS/LVPW: 1.02 CM
BH CV ECHO MEAS - IVSD: 1.33 CM
BH CV ECHO MEAS - LAT PEAK E' VEL: 17.4 CM/SEC
BH CV ECHO MEAS - LV DIASTOLIC VOL/BSA (35-75): 35.1 CM2
BH CV ECHO MEAS - LV MASS(C)D: 189.3 GRAMS
BH CV ECHO MEAS - LV MAX PG: 3.1 MMHG
BH CV ECHO MEAS - LV MEAN PG: 1.83 MMHG
BH CV ECHO MEAS - LV SYSTOLIC VOL/BSA (12-30): 14.2 CM2
BH CV ECHO MEAS - LV V1 MAX: 87.5 CM/SEC
BH CV ECHO MEAS - LV V1 VTI: 11.8 CM
BH CV ECHO MEAS - LVIDD: 4 CM
BH CV ECHO MEAS - LVIDS: 2.6 CM
BH CV ECHO MEAS - LVOT AREA: 3.3 CM2
BH CV ECHO MEAS - LVOT DIAM: 2.05 CM
BH CV ECHO MEAS - LVPWD: 1.3 CM
BH CV ECHO MEAS - MED PEAK E' VEL: 8.1 CM/SEC
BH CV ECHO MEAS - MV DEC SLOPE: 704.6 CM/SEC2
BH CV ECHO MEAS - MV DEC TIME: 0.08 MSEC
BH CV ECHO MEAS - MV E MAX VEL: 55.1 CM/SEC
BH CV ECHO MEAS - MV MAX PG: 2.47 MMHG
BH CV ECHO MEAS - MV MEAN PG: 1.13 MMHG
BH CV ECHO MEAS - MV P1/2T: 29.4 MSEC
BH CV ECHO MEAS - MV V2 VTI: 7.3 CM
BH CV ECHO MEAS - MVA(P1/2T): 7.5 CM2
BH CV ECHO MEAS - MVA(VTI): 5.4 CM2
BH CV ECHO MEAS - PA ACC TIME: 0.07 SEC
BH CV ECHO MEAS - PA PR(ACCEL): 48.1 MMHG
BH CV ECHO MEAS - PA V2 MAX: 86.9 CM/SEC
BH CV ECHO MEAS - PULM A REVS DUR: 0.08 SEC
BH CV ECHO MEAS - PULM A REVS VEL: 52.3 CM/SEC
BH CV ECHO MEAS - PULM DIAS VEL: 40.3 CM/SEC
BH CV ECHO MEAS - PULM S/D: 1.39
BH CV ECHO MEAS - PULM SYS VEL: 56.1 CM/SEC
BH CV ECHO MEAS - QP/QS: 0.8
BH CV ECHO MEAS - RAP SYSTOLE: 8 MMHG
BH CV ECHO MEAS - RV MAX PG: 1.36 MMHG
BH CV ECHO MEAS - RV V1 MAX: 58.3 CM/SEC
BH CV ECHO MEAS - RV V1 VTI: 10 CM
BH CV ECHO MEAS - RVOT DIAM: 2 CM
BH CV ECHO MEAS - RVSP: 31.1 MMHG
BH CV ECHO MEAS - SI(MOD-SP2): 21.8 ML/M2
BH CV ECHO MEAS - SI(MOD-SP4): 20.9 ML/M2
BH CV ECHO MEAS - SUP REN AO DIAM: 1.6 CM
BH CV ECHO MEAS - SV(LVOT): 39 ML
BH CV ECHO MEAS - SV(MOD-SP2): 46 ML
BH CV ECHO MEAS - SV(MOD-SP4): 44 ML
BH CV ECHO MEAS - SV(RVOT): 31.4 ML
BH CV ECHO MEAS - TAPSE (>1.6): 1.63 CM
BH CV ECHO MEAS - TR MAX PG: 23.1 MMHG
BH CV ECHO MEAS - TR MAX VEL: 240.3 CM/SEC
BH CV ECHO MEASUREMENTS AVERAGE E/E' RATIO: 4.32
BH CV XLRA - RV BASE: 2.41 CM
BH CV XLRA - RV LENGTH: 7.7 CM
BH CV XLRA - RV MID: 2.36 CM
BH CV XLRA - TDI S': 8.5 CM/SEC
LEFT ATRIUM VOLUME INDEX: 17.5 ML/M2
MAXIMAL PREDICTED HEART RATE: 142 BPM
SINUS: 3.2 CM
STJ: 3.2 CM
STRESS TARGET HR: 121 BPM

## 2022-11-04 PROCEDURE — 93000 ELECTROCARDIOGRAM COMPLETE: CPT | Performed by: NURSE PRACTITIONER

## 2022-11-04 PROCEDURE — 96374 THER/PROPH/DIAG INJ IV PUSH: CPT

## 2022-11-04 PROCEDURE — 93306 TTE W/DOPPLER COMPLETE: CPT | Performed by: INTERNAL MEDICINE

## 2022-11-04 PROCEDURE — 99214 OFFICE O/P EST MOD 30 MIN: CPT | Performed by: NURSE PRACTITIONER

## 2022-11-04 PROCEDURE — 93306 TTE W/DOPPLER COMPLETE: CPT

## 2022-11-04 PROCEDURE — 36415 COLL VENOUS BLD VENIPUNCTURE: CPT

## 2022-11-04 RX ORDER — METOPROLOL TARTRATE 5 MG/5ML
2.5 INJECTION INTRAVENOUS ONCE
Status: COMPLETED | OUTPATIENT
Start: 2022-11-04 | End: 2022-11-04

## 2022-11-04 RX ORDER — METOPROLOL SUCCINATE 25 MG/1
25 TABLET, EXTENDED RELEASE ORAL 2 TIMES DAILY
Qty: 60 TABLET | Refills: 5 | Status: SHIPPED | OUTPATIENT
Start: 2022-11-04

## 2022-11-04 RX ADMIN — METOPROLOL TARTRATE 2.5 MG: 5 INJECTION INTRAVENOUS at 13:56

## 2022-11-04 NOTE — PROGRESS NOTES
Date of Office Visit: 22  Encounter Provider: SADIE Jarquin  Place of Service: [facility] CARDIOLOGY  Patient Name: Paul English  :1945    Chief Complaint   Patient presents with   • Paroxysmal atrial fibrillation (HCC)   • Hypertension   • Follow-up   :     HPI: Paul English is a 77 y.o. male  with hypertension, hyperlipidemia, diabetes mellitus, paroxysmal atrial fibrillation, and history of bladder cancer      He is now followed by Dr. Mccracken.  I will visit him for the first time today have reviewed his medical record.  He had an echocardiogram in 2019 which showed sigmoid septum but otherwise normal LV function and no significant valve disease.  He was seen in the emergency room in 2022 with palpitations and diaphoresis.  EKG showed atrial fibrillation with RVR.  He was given a dose of IV digoxin and converted to normal sinus rhythm with a pulse in the upper 40s.  He was started on Xarelto.  He then had hematuria and ultimately had cystoscopy.  After the cystoscopy he was restarted on Xarelto.  He presents today for reassessment.  He had echocardiogram completed prior to our visit.  He was told that his cystoscopy was okay.  He has no visible blood in his urine or stool at this time.  He is tachycardic today and felt that his heart was racing this morning but he does not feel that his heart is racing right now even though his pulse was 137 on EKG during our office visit.  His pulse was also elevated during the echocardiogram.  He takes metoprolol succinate 25 mg in the morning and in the evening.  He has been back on Xarelto since the last week of October.  He is accompanied by his wife today.  He has been very active at home.  He has painted 2 rooms in his also picked up leaves in the yard.  He denies chest pain and shortness of breath.  He has no edema.  He has snoring and witnessed apnea.  He also reports leg pain with exertion at  "times.        Allergies   Allergen Reactions   • Chlorhexidine Other (See Comments)           Family and social history reviewed.     ROS  All other systems were reviewed and are negative          Objective:     Vitals:    11/04/22 1254   BP: 108/70   BP Location: Left arm   Patient Position: Sitting   Pulse: (!) 137   Weight: 84.8 kg (187 lb)   Height: 185.4 cm (73\")     Body mass index is 24.67 kg/m².    PHYSICAL EXAM:  Pulmonary:      Effort: Pulmonary effort is normal.      Breath sounds: Normal breath sounds.   Cardiovascular:      Tachycardia present. Regularly irregular rhythm.           ECG 12 Lead    Date/Time: 11/4/2022 2:01 PM  Performed by: Tricia Corona APRN  Authorized by: Tricia Corona APRN   Comparison: compared with previous ECG   Similar to previous ECG  Rhythm: atrial flutter  Rate: tachycardic  Comments: SVT, felt to be a flutter              Current Outpatient Medications   Medication Sig Dispense Refill   • Cholecalciferol (VITAMIN D3) 2000 units capsule Take 2,000 Units by mouth.     • glipiZIDE (GLUCOTROL) 10 MG tablet Take 10 mg by mouth 2 (Two) Times a Day Before Meals.     • metoprolol succinate XL (TOPROL-XL) 25 MG 24 hr tablet Take 1 tablet by mouth 2 (Two) Times a Day. 60 tablet 5   • Multiple Vitamins-Minerals (OCUVITE ADULT 50+ PO) Take  by mouth.     • rivaroxaban (XARELTO) 15 MG tablet Take 1 tablet by mouth Daily. 90 tablet 2   • rosuvastatin (CRESTOR) 5 MG tablet Take 5 mg by mouth Daily.       No current facility-administered medications for this visit.     Assessment:       Diagnosis Plan   1. Paroxysmal atrial fibrillation (HCC)  Holter Monitor - 48 Hour    metoprolol tartrate (LOPRESSOR) injection 2.5 mg      2. Paroxysmal SVT (supraventricular tachycardia) (HCC)  Holter Monitor - 48 Hour      3. Essential hypertension             Orders Placed This Encounter   Procedures   • Holter Monitor - 48 Hour     Standing Status:   Future     Standing Expiration Date:   11/4/2023     " Order Specific Question:   Reason for exam?     Answer:   AFib     Order Specific Question:   Reason for exam?     Answer:   Palpitations     Order Specific Question:   AFib specification?     Answer:   Paroxysmal     Order Specific Question:   Release to patient     Answer:   Routine Release   • ECG 12 Lead     This order was created via procedure documentation     Order Specific Question:   Release to patient     Answer:   Routine Release         Plan:       1.  77-year-old gentleman with paroxysmal atrial fibrillation.  [id number].  Anticoagulated with Xarelto.  He has no further bleeding at this time.  He is in what appears to be atrial flutter today with a pulse of 130-140.  He felt some palpitations this morning but at this time he denies palpitations despite tachycardia.  I will give him 1 dose of IV metoprolol 2.5 mg daily.  When he is in normal sinus rhythm his pulse is upper 40s-mid 50s so his metoprolol has not been increased up until this point.  He takes metoprolol succinate 25 mg twice daily.  I will arrange for follow-up 48-hour Holter and consider possible EP evaluation  2.  Hyperlipidemia on rosuvastatin 5 mg  3.  Diabetes mellitus on therapy  4.  Hyperlipidemia on rosuvastatin 5 mg  5.  Hematuria September 2022 status post cystoscopy which was reportedly benign.  6.  History of bladder cancer status post right nephroureterectomy 2018 follows with urology  7.  Snoring and witnessed apnea.  We have agreed to pursue home sleep study  8.  Leg pain with exertion-we will check LIZZY  Further recommendation pending the results of 48-hour Holter            It has been a pleasure to participate in this patient's care.      Thank you,  SADIE Jarquin      **I used Dragon to dictate this note:**

## 2022-11-04 NOTE — TELEPHONE ENCOUNTER
Please let him know that his echo showed normal heart function.  No significant valve disease.  Keep appointment with Tricia Corona at the end of the month.

## 2022-11-07 NOTE — TELEPHONE ENCOUNTER
Notified patient's wife of results/recommendations, allowed per GERALDINE.  She verbalized understanding.    Queenie Duckworth RN  Triage MG

## 2022-11-29 ENCOUNTER — OFFICE VISIT (OUTPATIENT)
Dept: CARDIOLOGY | Facility: CLINIC | Age: 77
End: 2022-11-29

## 2022-11-29 VITALS
BODY MASS INDEX: 24.78 KG/M2 | HEIGHT: 73 IN | WEIGHT: 187 LBS | DIASTOLIC BLOOD PRESSURE: 70 MMHG | SYSTOLIC BLOOD PRESSURE: 150 MMHG

## 2022-11-29 DIAGNOSIS — I48.0 PAROXYSMAL ATRIAL FIBRILLATION: Primary | ICD-10-CM

## 2022-11-29 DIAGNOSIS — I10 ESSENTIAL HYPERTENSION: ICD-10-CM

## 2022-11-29 PROCEDURE — 99214 OFFICE O/P EST MOD 30 MIN: CPT | Performed by: NURSE PRACTITIONER

## 2022-11-29 PROCEDURE — 93000 ELECTROCARDIOGRAM COMPLETE: CPT | Performed by: NURSE PRACTITIONER

## 2022-11-29 NOTE — PROGRESS NOTES
Date of Office Visit: 22  Encounter Provider: SADIE Jarquin  Place of Service: [facility] CARDIOLOGY  Patient Name: Paul English  :1945    Chief Complaint   Patient presents with   • Paroxysmal atrial fibrillation (HCC)   • Hypertension   • Follow-up   :     HPI: Paul English is a 77 y.o. male  with hypertension, hyperlipidemia, diabetes mellitus, paroxysmal atrial fibrillation, and history of bladder cancer        He is now followed by Dr. Mccracken.  I will visit with him in follow-up today and have reviewed his medical record.  He had an echocardiogram in 2019 which showed sigmoid septum but otherwise normal LV function and no significant valve disease.  He was seen in the emergency room in 2022 with palpitations and diaphoresis.  EKG showed atrial fibrillation with RVR.  He was given a dose of IV digoxin and converted to normal sinus rhythm with a pulse in the upper 40s.  He was started on Xarelto.  He then had hematuria and ultimately had cystoscopy.  After the cystoscopy he was restarted on Xarelto.  He presents today for reassessment.  He had echocardiogram completed prior to our visit.  He was told that his cystoscopy was okay.  He has no visible blood in his urine or stool at this time.  He is tachycardic today and felt that his heart was racing this morning but he does not feel that his heart is racing right now even though his pulse was 137 on EKG during our office visit.  His pulse was also elevated during the echocardiogram.  He takes metoprolol succinate 25 mg in the morning and in the evening.  He has been back on Xarelto since the last week of October.     On 2022 he presented with rapid A. fib/flutter and received IV metoprolol in the office.  Metoprolol succinate was adjusted to 25 mg twice daily.  48-hour Holter showed no A. fib    He presents today for reassessment.  He brought me a list of home blood pressure  "readings from blood pressure anywhere from 126-150/60-84 with pulse mainly  Mid 40s-60.  He has been feeling good and denies any recent palpitations.  He actually felt when his heart was beating fast at our last visit.  He denies near-syncope or syncope or blood in the urine or stool.  No chest pain or shortness of breath.  He is scheduled for home sleep study next month        Allergies   Allergen Reactions   • Chlorhexidine Other (See Comments)           Family and social history reviewed.     ROS  All other systems were reviewed and are negative          Objective:     Vitals:    11/29/22 1146   BP: 150/70   BP Location: Left arm   Patient Position: Sitting   Weight: 84.8 kg (187 lb)   Height: 185.4 cm (73\")     Body mass index is 24.67 kg/m².    PHYSICAL EXAM:  Pulmonary:      Effort: Pulmonary effort is normal.   Cardiovascular:      Normal rate. Regular rhythm.           ECG 12 Lead    Date/Time: 11/29/2022 12:36 PM  Performed by: Tricia Corona APRN  Authorized by: Tricia Corona APRN   Comparison: compared with previous ECG   Similar to previous ECG  Rhythm: sinus rhythm  Rate: normal  QRS axis: normal  Other findings: left atrial abnormality              Current Outpatient Medications   Medication Sig Dispense Refill   • Cholecalciferol (VITAMIN D3) 2000 units capsule Take 2,000 Units by mouth.     • glipiZIDE (GLUCOTROL) 10 MG tablet Take 10 mg by mouth 2 (Two) Times a Day Before Meals.     • metoprolol succinate XL (TOPROL-XL) 25 MG 24 hr tablet Take 1 tablet by mouth 2 (Two) Times a Day. 60 tablet 5   • Multiple Vitamins-Minerals (OCUVITE ADULT 50+ PO) Take  by mouth.     • rivaroxaban (XARELTO) 15 MG tablet Take 1 tablet by mouth Daily. 90 tablet 2   • rosuvastatin (CRESTOR) 5 MG tablet Take 5 mg by mouth Daily.       No current facility-administered medications for this visit.     Assessment:       Diagnosis Plan   1. Paroxysmal atrial fibrillation (HCC)        2. Essential hypertension         "     Orders Placed This Encounter   Procedures   • ECG 12 Lead     This order was created via procedure documentation     Order Specific Question:   Release to patient     Answer:   Routine Release         Plan:       1.  77-year-old gentleman with paroxysmal atrial fibrillation.  [id number].  Anticoagulated with Xarelto.  He has no further bleeding at this time.  He is in normal sinus rhythm and has not had any palpitations since November 4.  We will continue to follow him clinically and he is to call with any questions or concerns.    2.  Hyperlipidemia on rosuvastatin 5 mg  3.  Diabetes mellitus on therapy  4.  Hyperlipidemia on rosuvastatin 5 mg  5.  Hematuria September 2022 status post cystoscopy which was reportedly benign.  6.  History of bladder cancer status post right nephroureterectomy 2018 follows with urology  7.  Snoring and witnessed apnea.  We have agreed to pursue home sleep study and he is scheduled next month  8.  Leg pain with exertion-we will check LIZZY and this is scheduled next week      Follow-up in 6 months.  We will discuss his results as they come in             It has been a pleasure to participate in this patient's care.      Thank you,  SADIE Jarquin      **I used Dragon to dictate this note:**

## 2022-12-08 ENCOUNTER — HOSPITAL ENCOUNTER (OUTPATIENT)
Dept: CARDIOLOGY | Facility: HOSPITAL | Age: 77
Discharge: HOME | End: 2022-12-08
Admitting: NURSE PRACTITIONER

## 2022-12-08 DIAGNOSIS — M79.604 LEG PAIN, BILATERAL: ICD-10-CM

## 2022-12-08 DIAGNOSIS — M79.605 LEG PAIN, BILATERAL: ICD-10-CM

## 2022-12-08 DIAGNOSIS — I73.9 CLAUDICATION OF BOTH LOWER EXTREMITIES: ICD-10-CM

## 2022-12-08 LAB
BH CV LOWER ARTERIAL LEFT ABI RATIO: 1.17
BH CV LOWER ARTERIAL LEFT DORSALIS PEDIS SYS MAX: 168
BH CV LOWER ARTERIAL LEFT GREAT TOE SYS MAX: 145
BH CV LOWER ARTERIAL LEFT POST EX ABI RATIO: 1.26
BH CV LOWER ARTERIAL LEFT POST TIBIAL SYS MAX: 169
BH CV LOWER ARTERIAL LEFT TBI RATIO: 1
BH CV LOWER ARTERIAL RIGHT ABI RATIO: 1.19
BH CV LOWER ARTERIAL RIGHT DORSALIS PEDIS SYS MAX: 172
BH CV LOWER ARTERIAL RIGHT GREAT TOE SYS MAX: 142
BH CV LOWER ARTERIAL RIGHT POST EX ABI RATIO: 1.32
BH CV LOWER ARTERIAL RIGHT POST TIBIAL SYS MAX: 171
BH CV LOWER ARTERIAL RIGHT TBI RATIO: 0.98
MAXIMAL PREDICTED HEART RATE: 142 BPM
STRESS TARGET HR: 121 BPM
UPPER ARTERIAL LEFT ARM BRACHIAL SYS MAX: 145 MMHG
UPPER ARTERIAL RIGHT ARM BRACHIAL SYS MAX: 137 MMHG

## 2022-12-08 PROCEDURE — 93924 LWR XTR VASC STDY BILAT: CPT

## 2022-12-22 ENCOUNTER — HOSPITAL ENCOUNTER (OUTPATIENT)
Dept: SLEEP MEDICINE | Facility: HOSPITAL | Age: 77
Discharge: HOME | End: 2022-12-22
Admitting: INTERNAL MEDICINE
Payer: MEDICARE

## 2022-12-22 PROCEDURE — 95806 SLEEP STUDY UNATT&RESP EFFT: CPT | Performed by: INTERNAL MEDICINE

## 2022-12-22 PROCEDURE — 95806 SLEEP STUDY UNATT&RESP EFFT: CPT

## 2023-01-06 ENCOUNTER — TELEPHONE (OUTPATIENT)
Dept: CARDIOLOGY | Facility: CLINIC | Age: 78
End: 2023-01-06
Payer: MEDICARE

## 2023-01-06 ENCOUNTER — TELEPHONE (OUTPATIENT)
Dept: SLEEP MEDICINE | Facility: HOSPITAL | Age: 78
End: 2023-01-06
Payer: MEDICARE

## 2023-01-06 DIAGNOSIS — G47.33 OSA (OBSTRUCTIVE SLEEP APNEA): Primary | ICD-10-CM

## 2023-01-06 DIAGNOSIS — R06.83 SNORING: ICD-10-CM

## 2023-01-06 NOTE — TELEPHONE ENCOUNTER
Please inform patient home sleep study shows mild sleep apnea with 21% snoring time.  The sleep medicine doctor, Dr. Sal recommends following up in his office to discuss.  If patient is agreeable to do that, I will place a referral to Dr. Sal.  Needs to keep may appt with me

## 2023-01-06 NOTE — TELEPHONE ENCOUNTER
I spoke with Paul English and updated pt on results/recommendations from provider.  Pt verbalized understanding and has no further questions at this time.    He is agreeable for the referral for .    Thank you,    Dede Merino, KHURRAM  Triage [facility]  01/06/23 08:27 EST

## 2023-02-23 ENCOUNTER — OFFICE VISIT (OUTPATIENT)
Dept: SLEEP MEDICINE | Facility: HOSPITAL | Age: 78
End: 2023-02-23
Payer: MEDICARE

## 2023-02-23 VITALS
DIASTOLIC BLOOD PRESSURE: 75 MMHG | WEIGHT: 190 LBS | OXYGEN SATURATION: 96 % | SYSTOLIC BLOOD PRESSURE: 176 MMHG | HEART RATE: 51 BPM | HEIGHT: 73 IN | BODY MASS INDEX: 25.18 KG/M2

## 2023-02-23 DIAGNOSIS — R06.83 SNORING: Primary | ICD-10-CM

## 2023-02-23 PROCEDURE — G0463 HOSPITAL OUTPT CLINIC VISIT: HCPCS

## 2023-02-23 PROCEDURE — 99202 OFFICE O/P NEW SF 15 MIN: CPT | Performed by: INTERNAL MEDICINE

## 2023-02-23 NOTE — PROGRESS NOTES
"  [facility]  [address]  [address]  [place] [address]  Phone   Fax       SLEEP CLINIC FOLLOW UP PROGRESS NOTE.    Paul English  [id number]   1945  77 y.o.  male      PCP: Maria Luz Mcnally MD      Date of visit: 2/23/2023    Chief Complaint   Patient presents with   • Snoring       HPI:  This is a 77 y.o. years old patient who has a history of snoring and other symptoms of sleep apnea was evaluated recently and underwent sleep test.  Patient is here to discuss the test results and treatment options.  I have discussed with the patient the test results.  To summarize the test shows no evidence of sleep apnea.  The AHI(apnea-hypopnea index) is 6 /hr, less than 5.0 is normal.  But the test showed snoring for 21% of sleep time with 140 episodes.  No evidence of oxygen desaturation.     Medication and allergies are reviewed by me and documented in the encounter.     SOCIAL ( habits pertaining to sleep medicine)  History of smoking:No   History of alcohol use: 0 per week  Caffeine use: 2     REVIEW OF SYSTEMS:   [place] Sleepiness Scale :Total score: 1   Snoring: Yes   Nasal congestion:No       PHYSICAL EXAMINATION:  CONSTITUTIONAL:  Vitals:    02/23/23 1128   BP: 176/75   Pulse: 51   SpO2: 96%   Weight: 86.2 kg (190 lb)   Height: 185.4 cm (73\")    Body mass index is 25.07 kg/m².  NOSE: nasal passages are clear, no nasal polyps, septum in the midline.  THROAT: throat is clear,       ASSESSMENT AND PLAN:  · Snoring R 06.83 /  · Patient is advised to lose weight, Body mass index is 25.07 kg/m².  · Advised to sleep on the side, patient can use body pillow to prevent rollover  · Sleep hygiene, with the regular sleep time and wake time  · Advised to avoid alcohol and other sedative medications  · Patient is advised to wear dentures at bedtime.  · Mild sleep apnea with AHI of 6/h does not require CPAP  · Return if symptoms worsen or fail to improve. . Patient's " questions were answered.        Jassi Sal MD  Sleep Medicine  Medical Director for [facility]  2/23/2023

## 2023-05-31 ENCOUNTER — OFFICE VISIT (OUTPATIENT)
Dept: CARDIOLOGY | Facility: CLINIC | Age: 78
End: 2023-05-31

## 2023-05-31 VITALS
DIASTOLIC BLOOD PRESSURE: 80 MMHG | SYSTOLIC BLOOD PRESSURE: 126 MMHG | WEIGHT: 188 LBS | HEART RATE: 50 BPM | BODY MASS INDEX: 24.92 KG/M2 | HEIGHT: 73 IN | OXYGEN SATURATION: 98 %

## 2023-05-31 DIAGNOSIS — G47.33 OSA (OBSTRUCTIVE SLEEP APNEA): ICD-10-CM

## 2023-05-31 DIAGNOSIS — I48.0 PAROXYSMAL ATRIAL FIBRILLATION: Primary | ICD-10-CM

## 2023-05-31 DIAGNOSIS — I10 ESSENTIAL HYPERTENSION: ICD-10-CM

## 2023-05-31 PROCEDURE — 93000 ELECTROCARDIOGRAM COMPLETE: CPT | Performed by: NURSE PRACTITIONER

## 2023-05-31 PROCEDURE — 3079F DIAST BP 80-89 MM HG: CPT | Performed by: NURSE PRACTITIONER

## 2023-05-31 PROCEDURE — 3074F SYST BP LT 130 MM HG: CPT | Performed by: NURSE PRACTITIONER

## 2023-05-31 PROCEDURE — 99214 OFFICE O/P EST MOD 30 MIN: CPT | Performed by: NURSE PRACTITIONER

## 2023-05-31 RX ORDER — METOPROLOL SUCCINATE 25 MG/1
25 TABLET, EXTENDED RELEASE ORAL 2 TIMES DAILY
Qty: 180 TABLET | Refills: 3 | Status: SHIPPED | OUTPATIENT
Start: 2023-05-31

## 2023-05-31 RX ORDER — TAMSULOSIN HYDROCHLORIDE 0.4 MG/1
1 CAPSULE ORAL DAILY
COMMUNITY

## 2023-05-31 NOTE — PROGRESS NOTES
Date of Office Visit: 23  Encounter Provider: SADIE Jarquin  Place of Service: [facility]  Patient Name: Paul English  :1945    Chief Complaint   Patient presents with   • Atrial Fibrillation   • Hypertension     6 moth f/u    :     HPI: Paul English is a 77 y.o. male  with hypertension, hyperlipidemia, diabetes mellitus, paroxysmal atrial fibrillation, and history of bladder cancer.        He is now followed by Dr. Mccracken.  I will visit with him in follow-up today and have reviewed his medical record.  He had an echocardiogram in 2019 which showed sigmoid septum but otherwise normal LV function and no significant valve disease.  He was seen in the emergency room in 2022 with palpitations and diaphoresis.  EKG showed atrial fibrillation with RVR.  He was given a dose of IV digoxin and converted to normal sinus rhythm with a pulse in the upper 40s.  He was started on Xarelto.  He then had hematuria and ultimately had cystoscopy.  After the cystoscopy he was restarted on Xarelto.  He presents today for reassessment.  He had echocardiogram completed prior to our visit.  He was told that his cystoscopy was okay.  He has no visible blood in his urine or stool at this time.  He is tachycardic today and felt that his heart was racing this morning but he does not feel that his heart is racing right now even though his pulse was 137 on EKG during our office visit.  His pulse was also elevated during the echocardiogram.  He takes metoprolol succinate 25 mg in the morning and in the evening.  He has been back on Xarelto since the last week of October.      On 2022 he presented with rapid A. fib/flutter and received IV metoprolol in the office.  Metoprolol succinate was adjusted to 25 mg twice daily.  48-hour Holter showed no A. fib    He presents today for 6-month reassessment.  He has no chest pain tightness or pressure.  He has  "not had any atrial fibrillation since December but he did not feel bad.  He denies blood in the urine or stool and continues to take Xarelto daily.  He had cataract surgery on both eyes and since then his vision has been much better.  He has no near-syncope or syncope or chest pain or shortness of breath.  He is accompanied by spouse today who recently had right shoulder surgery and needs left shoulder surgery next week.          Allergies   Allergen Reactions   • Chlorhexidine Other (See Comments)           Family and social history reviewed.     ROS  All other systems were reviewed and are negative          Objective:     Vitals:    05/31/23 1104   BP: 126/80   Pulse: 50   SpO2: 98%   Weight: 85.3 kg (188 lb)   Height: 185.4 cm (73\")     Body mass index is 24.8 kg/m².    PHYSICAL EXAM:  Pulmonary:      Effort: Pulmonary effort is normal.      Breath sounds: Normal breath sounds.   Cardiovascular:      Normal rate. Regular rhythm.           ECG 12 Lead    Date/Time: 5/31/2023 11:17 AM  Performed by: Tricia Corona APRN  Authorized by: Tricia Corona APRN   Comparison: compared with previous ECG   Similar to previous ECG  Rate: normal  T flattening: III  Comments: No change from prior               Current Outpatient Medications   Medication Sig Dispense Refill   • Cholecalciferol (VITAMIN D3) 2000 units capsule Take 1 capsule by mouth.     • glipiZIDE (GLUCOTROL) 10 MG tablet Take 1 tablet by mouth 2 (Two) Times a Day Before Meals.     • metoprolol succinate XL (TOPROL-XL) 25 MG 24 hr tablet Take 1 tablet by mouth 2 (Two) Times a Day. 180 tablet 3   • Multiple Vitamins-Minerals (OCUVITE ADULT 50+ PO) Take  by mouth.     • Multiple Vitamins-Minerals (PRESERVISION AREDS 2 PO) Take  by mouth.     • rivaroxaban (XARELTO) 15 MG tablet Take 1 tablet by mouth Daily. 90 tablet 2   • rosuvastatin (CRESTOR) 5 MG tablet Take 1 tablet by mouth Daily.     • tamsulosin (FLOMAX) 0.4 MG capsule 24 hr capsule Take 1 capsule by " mouth Daily.       No current facility-administered medications for this visit.     Assessment:       Diagnosis Plan   1. Paroxysmal atrial fibrillation  ECG 12 Lead      2. ANDREA (obstructive sleep apnea)  ECG 12 Lead      3. Essential hypertension  ECG 12 Lead           Orders Placed This Encounter   Procedures   • ECG 12 Lead     This order was created via procedure documentation     Order Specific Question:   Release to patient     Answer:   Routine Release         Plan:           1.  77-year-old gentleman with paroxysmal atrial fibrillation.  [id number].  Anticoagulated with Xarelto 15 mg daily.  He has no further bleeding on this dose. He will continue on toprol Xl 25 mg twice daily. He is in NSR today. Call with questions or concerns.   Otherwise, follow up in 6 months.  2.  Hyperlipidemia on rosuvastatin 5 mg  3.  Diabetes mellitus on therapy  4.  Hyperlipidemia on rosuvastatin 5 mg  5.  Hematuria September 2022 status post cystoscopy which was reportedly benign.  6.  History of bladder cancer status post right nephroureterectomy 2018 follows with urology  7. Mild ANDREA on HST 12/2022- not on pap therapy   8.  normal bilateral LIZZY 12/2022                It has been a pleasure to participate in this patient's care.      Thank you,  SADIE Jarquin      **I used Dragon to dictate this note:**

## 2023-09-19 RX ORDER — METOPROLOL SUCCINATE 25 MG/1
25 TABLET, EXTENDED RELEASE ORAL 2 TIMES DAILY
Qty: 180 TABLET | Refills: 2 | Status: SHIPPED | OUTPATIENT
Start: 2023-09-19

## 2023-09-29 ENCOUNTER — TELEPHONE (OUTPATIENT)
Dept: CARDIOLOGY | Facility: CLINIC | Age: 78
End: 2023-09-29
Payer: MEDICARE

## 2023-09-29 NOTE — TELEPHONE ENCOUNTER
Pt's wife called the office this morning on his behalf.  Around 01:00 this morning as he was going to bed, pt went into AF.  At that time, he took 50 mg metoprolol succinate.  Today, his HR has ranged from , hanging in the 70-80s mostly.  /80.  He endorses only palpitations.    He's currently taking:  - Metoprolol succinate 25 mg BID  - Xarelto 15 mg QD    Do you have any recommendations for this patient?    Thank you,    Dede Merino RN  [facility] [place] Triage  09/29/23  09:54 EDT

## 2023-09-29 NOTE — TELEPHONE ENCOUNTER
I spoke with Paul English'd wife, Lucina, and updated her on recommendations from provider.  She verbalized understanding and has no further questions at this time.    She reports that she believes pt has already converted this morning.  His HR has been running 55-60s, last /90.    Thank you,    Dede Merino RN  Triage [facility] [place]  09/29/23 11:25 EDT

## 2023-09-29 NOTE — TELEPHONE ENCOUNTER
He should take metoprolol 25 BID today as well and relax, he will likely convert but definitely should not miss Xarelto. His rate is stable less than 110

## 2024-01-03 ENCOUNTER — OFFICE VISIT (OUTPATIENT)
Dept: CARDIOLOGY | Facility: CLINIC | Age: 79
End: 2024-01-03
Payer: COMMERCIAL

## 2024-01-03 VITALS
OXYGEN SATURATION: 98 % | HEIGHT: 73 IN | BODY MASS INDEX: 24.44 KG/M2 | DIASTOLIC BLOOD PRESSURE: 96 MMHG | HEART RATE: 71 BPM | SYSTOLIC BLOOD PRESSURE: 130 MMHG | WEIGHT: 184.4 LBS

## 2024-01-03 DIAGNOSIS — I48.0 PAROXYSMAL ATRIAL FIBRILLATION: Primary | ICD-10-CM

## 2024-01-03 DIAGNOSIS — Z79.01 LONG TERM (CURRENT) USE OF ANTICOAGULANTS: ICD-10-CM

## 2024-01-03 DIAGNOSIS — Z01.810 PREOPERATIVE CARDIOVASCULAR EXAMINATION: ICD-10-CM

## 2024-01-03 RX ORDER — LANCETS
EACH MISCELLANEOUS
COMMUNITY
Start: 2023-10-02

## 2024-01-03 RX ORDER — DAPAGLIFLOZIN 10 MG/1
TABLET, FILM COATED ORAL
COMMUNITY
Start: 2023-09-19

## 2024-01-03 RX ORDER — BLOOD SUGAR DIAGNOSTIC
STRIP MISCELLANEOUS
COMMUNITY
Start: 2023-10-22

## 2024-01-03 RX ORDER — BLOOD-GLUCOSE METER
EACH MISCELLANEOUS
COMMUNITY
Start: 2023-10-02

## 2024-01-03 NOTE — PROGRESS NOTES
"      CARDIOLOGY    Alison Mccracken MD    ENCOUNTER DATE:  01/03/2024    Paul English / 78 y.o. / male        CHIEF COMPLAINT / REASON FOR OFFICE VISIT     Paroxysmal atrial fibrillation      HISTORY OF PRESENT ILLNESS       HPI    Paul English is a 78 y.o. male     This is a patient who previously followed with Dr. Stone.  He has paroxysmal atrial fibrillation.  He had an echo in March 2018 which showed a sigmoid septum but otherwise normal LV function and no significant valve disease.  He was seen in the emergency room on August 2 with an episode of palpitations and diaphoresis.  Original EKG showed atrial fibrillation with rapid ventricular response. Repeat EKG showed sinus bradycardia.  Echocardiogram in November 2022 showed an ejection fraction of 58% with mild to moderate left ventricular hypertrophy, atrial fibrillation of a heart rate of 130.  48-hour Holter monitor November 2022 showed only sinus rhythm.    He comes in today for preoperative evaluation.  He denies chest pain, shortness of breath or palpitations.  He has to have foot surgery.  He stays active and says he does everything around the house including housework and yard work without any anginal symptoms.    REVIEW OF SYSTEMS     Review of Systems   Constitutional: Negative for chills, fever, weight gain and weight loss.   Cardiovascular:  Negative for leg swelling.   Respiratory:  Negative for cough, snoring and wheezing.    Hematologic/Lymphatic: Negative for bleeding problem. Does not bruise/bleed easily.   Skin:  Negative for color change.   Musculoskeletal:  Negative for falls, joint pain and myalgias.   Gastrointestinal:  Negative for melena.   Genitourinary:  Negative for hematuria.   Neurological:  Negative for excessive daytime sleepiness.   Psychiatric/Behavioral:  Negative for depression. The patient is not nervous/anxious.          VITAL SIGNS     Visit Vitals  /96   Pulse 71   Ht 185.4 cm (73\")   Wt 83.6 kg " (184 lb 6.4 oz)   SpO2 98%   BMI 24.33 kg/m²         Wt Readings from Last 3 Encounters:   01/03/24 83.6 kg (184 lb 6.4 oz)   05/31/23 85.3 kg (188 lb)   02/23/23 86.2 kg (190 lb)     Body mass index is 24.33 kg/m².      PHYSICAL EXAMINATION     Constitutional:       General: Not in acute distress.  Neck:      Vascular: No carotid bruit or JVD.   Pulmonary:      Effort: Pulmonary effort is normal.      Breath sounds: Normal breath sounds.   Cardiovascular:      Normal rate. Regular rhythm.      Murmurs: There is no murmur.   Psychiatric:         Mood and Affect: Mood and affect normal.           REVIEWED DATA       ECG 12 Lead    Date/Time: 1/3/2024 2:09 PM  Performed by: Alison Mccracken MD    Authorized by: Alison Mccracken MD  Comparison: compared with previous ECG   Similar to previous ECG  Rhythm: sinus rhythm  BPM: 71  Conduction: conduction normal  ST Segments: ST segments normal  T Waves: T waves normal    Clinical impression: normal ECG                Lab Results   Component Value Date    GLUCOSE 180 (H) 08/02/2022    BUN 33 (H) 08/02/2022    CREATININE 1.70 (H) 08/02/2022    EGFR 41.0 (L) 08/02/2022    BCR 19.4 08/02/2022    K 4.5 08/02/2022    CO2 25.1 08/02/2022    CALCIUM 9.3 08/02/2022    ALBUMIN 4.20 08/02/2022    BILITOT 0.5 08/02/2022    AST 16 08/02/2022    ALT 19 08/02/2022       ASSESSMENT & PLAN      Diagnosis Plan   1. Paroxysmal atrial fibrillation        2. Preoperative cardiovascular examination        3. Long term (current) use of anticoagulants            1. Paroxysmal atrial fibrillation. Symptomatic.  Continue current medications including Xarelto 15 mg a day.  2. Hypertension. Controlled.   3.  Preoperative evaluation prior to foot surgery.  He is low risk for cardiovascular event with surgery.  He may hold Xarelto for 3 days prior to the procedure.    Follow-up in a year if his insurance and [employer] will allow.      Orders Placed This Encounter   Procedures    ECG 12 Lead     This  order was created via procedure documentation     Order Specific Question:   Release to patient     Answer:   Routine Release [[id number]]           MEDICATIONS         Discharge Medications            Accurate as of January 3, 2024  2:10 PM. If you have any questions, ask your nurse or doctor.                Changes to Medications        Instructions Start Date   PRESERVISION AREDS 2 PO  What changed: Another medication with the same name was removed. Continue taking this medication, and follow the directions you see here.  Changed by: Alison Mccracken MD   Oral             Continue These Medications        Instructions Start Date   Accu-Chek Guide test strip  Generic drug: glucose blood       Accu-Chek Guide w/Device kit       Accu-Chek Softclix Lancets lancets       Farxiga 10 MG tablet  Generic drug: dapagliflozin Propanediol       glipizide 10 MG tablet  Commonly known as: GLUCOTROL   10 mg, Oral, 2 Times Daily Before Meals      metoprolol succinate XL 25 MG 24 hr tablet  Commonly known as: TOPROL-XL   25 mg, Oral, 2 Times Daily      rivaroxaban 15 MG tablet  Commonly known as: XARELTO   15 mg, Oral, Daily      rosuvastatin 5 MG tablet  Commonly known as: CRESTOR   5 mg, Oral, Daily      tamsulosin 0.4 MG capsule 24 hr capsule  Commonly known as: FLOMAX   1 capsule, Oral, Daily      Vitamin D3 50 MCG (2000 UT) capsule   2,000 Units, Oral                 Alison Mccracken MD  01/03/24  14:10 EST    Part of this note may be an electronic transcription/translation of spoken language to printed text using the Dragon dictation system.

## 2024-02-12 ENCOUNTER — PRE-ADMISSION TESTING (OUTPATIENT)
Dept: PREADMISSION TESTING | Facility: HOSPITAL | Age: 79
End: 2024-02-12
Payer: MEDICARE

## 2024-02-12 VITALS
HEIGHT: 73 IN | BODY MASS INDEX: 24.39 KG/M2 | TEMPERATURE: 97 F | DIASTOLIC BLOOD PRESSURE: 80 MMHG | WEIGHT: 184 LBS | SYSTOLIC BLOOD PRESSURE: 130 MMHG | HEART RATE: 86 BPM | RESPIRATION RATE: 16 BRPM | OXYGEN SATURATION: 95 %

## 2024-02-12 LAB
ANION GAP SERPL CALCULATED.3IONS-SCNC: 11.9 MMOL/L (ref 5–15)
BUN SERPL-MCNC: 28 MG/DL (ref 8–23)
BUN/CREAT SERPL: 18.2 (ref 7–25)
CALCIUM SPEC-SCNC: 9.9 MG/DL (ref 8.6–10.5)
CHLORIDE SERPL-SCNC: 108 MMOL/L (ref 98–107)
CO2 SERPL-SCNC: 22.1 MMOL/L (ref 22–29)
CREAT SERPL-MCNC: 1.54 MG/DL (ref 0.76–1.27)
DEPRECATED RDW RBC AUTO: 41.5 FL (ref 37–54)
EGFRCR SERPLBLD CKD-EPI 2021: 45.6 ML/MIN/1.73
ERYTHROCYTE [DISTWIDTH] IN BLOOD BY AUTOMATED COUNT: 12.9 % (ref 12.3–15.4)
GLUCOSE SERPL-MCNC: 247 MG/DL (ref 65–99)
HCT VFR BLD AUTO: 47.6 % (ref 37.5–51)
HGB BLD-MCNC: 16.1 G/DL (ref 13–17.7)
MCH RBC QN AUTO: 30.4 PG (ref 26.6–33)
MCHC RBC AUTO-ENTMCNC: 33.8 G/DL (ref 31.5–35.7)
MCV RBC AUTO: 90 FL (ref 79–97)
PLATELET # BLD AUTO: 167 10*3/MM3 (ref 140–450)
PMV BLD AUTO: 10.7 FL (ref 6–12)
POTASSIUM SERPL-SCNC: 4.4 MMOL/L (ref 3.5–5.2)
RBC # BLD AUTO: 5.29 10*6/MM3 (ref 4.14–5.8)
SODIUM SERPL-SCNC: 142 MMOL/L (ref 136–145)
WBC NRBC COR # BLD AUTO: 6.27 10*3/MM3 (ref 3.4–10.8)

## 2024-02-12 PROCEDURE — 80048 BASIC METABOLIC PNL TOTAL CA: CPT

## 2024-02-12 PROCEDURE — 36415 COLL VENOUS BLD VENIPUNCTURE: CPT

## 2024-02-12 PROCEDURE — 85027 COMPLETE CBC AUTOMATED: CPT

## 2024-02-16 RX ORDER — CEFAZOLIN SODIUM 2 G/100ML
2000 INJECTION, SOLUTION INTRAVENOUS ONCE
Qty: 100 ML | Refills: 0 | Status: COMPLETED | OUTPATIENT
Start: 2024-02-19 | End: 2024-02-19

## 2024-02-19 ENCOUNTER — HOSPITAL ENCOUNTER (OUTPATIENT)
Facility: HOSPITAL | Age: 79
Setting detail: HOSPITAL OUTPATIENT SURGERY
Discharge: HOME | End: 2024-02-19
Attending: ORTHOPAEDIC SURGERY | Admitting: ORTHOPAEDIC SURGERY
Payer: MEDICARE

## 2024-02-19 ENCOUNTER — APPOINTMENT (OUTPATIENT)
Dept: GENERAL RADIOLOGY | Facility: HOSPITAL | Age: 79
End: 2024-02-19
Payer: MEDICARE

## 2024-02-19 ENCOUNTER — ANESTHESIA (OUTPATIENT)
Dept: PERIOP | Facility: HOSPITAL | Age: 79
End: 2024-02-19
Payer: MEDICARE

## 2024-02-19 ENCOUNTER — ANESTHESIA EVENT (OUTPATIENT)
Dept: PERIOP | Facility: HOSPITAL | Age: 79
End: 2024-02-19
Payer: MEDICARE

## 2024-02-19 VITALS
OXYGEN SATURATION: 92 % | TEMPERATURE: 97.6 F | SYSTOLIC BLOOD PRESSURE: 133 MMHG | RESPIRATION RATE: 18 BRPM | HEART RATE: 69 BPM | DIASTOLIC BLOOD PRESSURE: 78 MMHG

## 2024-02-19 DIAGNOSIS — M76.822 POSTERIOR TIBIAL TENDINITIS OF LEFT LOWER EXTREMITY: Primary | ICD-10-CM

## 2024-02-19 LAB — GLUCOSE BLDC GLUCOMTR-MCNC: 154 MG/DL (ref 70–130)

## 2024-02-19 PROCEDURE — 25010000002 ROPIVACAINE PER 1 MG: Performed by: STUDENT IN AN ORGANIZED HEALTH CARE EDUCATION/TRAINING PROGRAM

## 2024-02-19 PROCEDURE — C1713 ANCHOR/SCREW BN/BN,TIS/BN: HCPCS | Performed by: ORTHOPAEDIC SURGERY

## 2024-02-19 PROCEDURE — 82948 REAGENT STRIP/BLOOD GLUCOSE: CPT

## 2024-02-19 PROCEDURE — 25810000003 LACTATED RINGERS PER 1000 ML: Performed by: STUDENT IN AN ORGANIZED HEALTH CARE EDUCATION/TRAINING PROGRAM

## 2024-02-19 PROCEDURE — 25010000002 DEXAMETHASONE PER 1 MG: Performed by: STUDENT IN AN ORGANIZED HEALTH CARE EDUCATION/TRAINING PROGRAM

## 2024-02-19 PROCEDURE — 25010000002 FENTANYL CITRATE (PF) 50 MCG/ML SOLUTION: Performed by: STUDENT IN AN ORGANIZED HEALTH CARE EDUCATION/TRAINING PROGRAM

## 2024-02-19 PROCEDURE — 25010000002 ESMOLOL 100 MG/10ML SOLUTION: Performed by: NURSE ANESTHETIST, CERTIFIED REGISTERED

## 2024-02-19 PROCEDURE — 76000 FLUOROSCOPY <1 HR PHYS/QHP: CPT

## 2024-02-19 PROCEDURE — 25010000002 PROPOFOL 200 MG/20ML EMULSION: Performed by: NURSE ANESTHETIST, CERTIFIED REGISTERED

## 2024-02-19 PROCEDURE — 25010000002 CEFAZOLIN IN DEXTROSE 2-4 GM/100ML-% SOLUTION: Performed by: ORTHOPAEDIC SURGERY

## 2024-02-19 PROCEDURE — 25010000002 SUGAMMADEX 200 MG/2ML SOLUTION: Performed by: NURSE ANESTHETIST, CERTIFIED REGISTERED

## 2024-02-19 RX ORDER — PHENYLEPHRINE HCL IN 0.9% NACL 1 MG/10 ML
SYRINGE (ML) INTRAVENOUS AS NEEDED
Status: DISCONTINUED | OUTPATIENT
Start: 2024-02-19 | End: 2024-02-19 | Stop reason: SURG

## 2024-02-19 RX ORDER — PROPOFOL 10 MG/ML
INJECTION, EMULSION INTRAVENOUS AS NEEDED
Status: DISCONTINUED | OUTPATIENT
Start: 2024-02-19 | End: 2024-02-19 | Stop reason: SURG

## 2024-02-19 RX ORDER — LIDOCAINE HYDROCHLORIDE 20 MG/ML
INJECTION, SOLUTION EPIDURAL; INFILTRATION; INTRACAUDAL; PERINEURAL AS NEEDED
Status: DISCONTINUED | OUTPATIENT
Start: 2024-02-19 | End: 2024-02-19 | Stop reason: SURG

## 2024-02-19 RX ORDER — LABETALOL HYDROCHLORIDE 5 MG/ML
5 INJECTION, SOLUTION INTRAVENOUS
Status: DISCONTINUED | OUTPATIENT
Start: 2024-02-19 | End: 2024-02-19 | Stop reason: HOSPADM

## 2024-02-19 RX ORDER — DROPERIDOL 2.5 MG/ML
0.62 INJECTION, SOLUTION INTRAMUSCULAR; INTRAVENOUS
Status: DISCONTINUED | OUTPATIENT
Start: 2024-02-19 | End: 2024-02-19 | Stop reason: HOSPADM

## 2024-02-19 RX ORDER — EPHEDRINE SULFATE 50 MG/ML
5 INJECTION, SOLUTION INTRAVENOUS ONCE AS NEEDED
Status: DISCONTINUED | OUTPATIENT
Start: 2024-02-19 | End: 2024-02-19 | Stop reason: HOSPADM

## 2024-02-19 RX ORDER — IPRATROPIUM BROMIDE AND ALBUTEROL SULFATE 2.5; .5 MG/3ML; MG/3ML
3 SOLUTION RESPIRATORY (INHALATION) ONCE AS NEEDED
Status: DISCONTINUED | OUTPATIENT
Start: 2024-02-19 | End: 2024-02-19 | Stop reason: HOSPADM

## 2024-02-19 RX ORDER — HYDRALAZINE HYDROCHLORIDE 20 MG/ML
5 INJECTION INTRAMUSCULAR; INTRAVENOUS
Status: DISCONTINUED | OUTPATIENT
Start: 2024-02-19 | End: 2024-02-19 | Stop reason: HOSPADM

## 2024-02-19 RX ORDER — DIPHENHYDRAMINE HYDROCHLORIDE 50 MG/ML
12.5 INJECTION INTRAMUSCULAR; INTRAVENOUS
Status: DISCONTINUED | OUTPATIENT
Start: 2024-02-19 | End: 2024-02-19 | Stop reason: HOSPADM

## 2024-02-19 RX ORDER — SODIUM CHLORIDE, SODIUM LACTATE, POTASSIUM CHLORIDE, CALCIUM CHLORIDE 600; 310; 30; 20 MG/100ML; MG/100ML; MG/100ML; MG/100ML
9 INJECTION, SOLUTION INTRAVENOUS CONTINUOUS
Status: DISCONTINUED | OUTPATIENT
Start: 2024-02-19 | End: 2024-02-19 | Stop reason: HOSPADM

## 2024-02-19 RX ORDER — HYDROMORPHONE HYDROCHLORIDE 1 MG/ML
0.25 INJECTION, SOLUTION INTRAMUSCULAR; INTRAVENOUS; SUBCUTANEOUS
Status: DISCONTINUED | OUTPATIENT
Start: 2024-02-19 | End: 2024-02-19 | Stop reason: HOSPADM

## 2024-02-19 RX ORDER — ROPIVACAINE HYDROCHLORIDE 5 MG/ML
INJECTION, SOLUTION EPIDURAL; INFILTRATION; PERINEURAL
Status: COMPLETED | OUTPATIENT
Start: 2024-02-19 | End: 2024-02-19

## 2024-02-19 RX ORDER — FENTANYL CITRATE 50 UG/ML
50 INJECTION, SOLUTION INTRAMUSCULAR; INTRAVENOUS ONCE AS NEEDED
Status: COMPLETED | OUTPATIENT
Start: 2024-02-19 | End: 2024-02-19

## 2024-02-19 RX ORDER — HYDROCODONE BITARTRATE AND ACETAMINOPHEN 7.5; 325 MG/1; MG/1
1 TABLET ORAL EVERY 4 HOURS PRN
Status: DISCONTINUED | OUTPATIENT
Start: 2024-02-19 | End: 2024-02-19 | Stop reason: HOSPADM

## 2024-02-19 RX ORDER — PROMETHAZINE HYDROCHLORIDE 25 MG/1
25 SUPPOSITORY RECTAL ONCE AS NEEDED
Status: DISCONTINUED | OUTPATIENT
Start: 2024-02-19 | End: 2024-02-19 | Stop reason: HOSPADM

## 2024-02-19 RX ORDER — FLUMAZENIL 0.1 MG/ML
0.2 INJECTION INTRAVENOUS AS NEEDED
Status: DISCONTINUED | OUTPATIENT
Start: 2024-02-19 | End: 2024-02-19 | Stop reason: HOSPADM

## 2024-02-19 RX ORDER — EPHEDRINE SULFATE 50 MG/ML
INJECTION, SOLUTION INTRAVENOUS AS NEEDED
Status: DISCONTINUED | OUTPATIENT
Start: 2024-02-19 | End: 2024-02-19 | Stop reason: SURG

## 2024-02-19 RX ORDER — HYDROCODONE BITARTRATE AND ACETAMINOPHEN 5; 325 MG/1; MG/1
1 TABLET ORAL ONCE AS NEEDED
Status: DISCONTINUED | OUTPATIENT
Start: 2024-02-19 | End: 2024-02-19 | Stop reason: HOSPADM

## 2024-02-19 RX ORDER — FENTANYL CITRATE 50 UG/ML
25 INJECTION, SOLUTION INTRAMUSCULAR; INTRAVENOUS
Status: DISCONTINUED | OUTPATIENT
Start: 2024-02-19 | End: 2024-02-19 | Stop reason: HOSPADM

## 2024-02-19 RX ORDER — DEXAMETHASONE SODIUM PHOSPHATE 4 MG/ML
INJECTION, SOLUTION INTRA-ARTICULAR; INTRALESIONAL; INTRAMUSCULAR; INTRAVENOUS; SOFT TISSUE
Status: COMPLETED | OUTPATIENT
Start: 2024-02-19 | End: 2024-02-19

## 2024-02-19 RX ORDER — NALOXONE HCL 0.4 MG/ML
0.2 VIAL (ML) INJECTION AS NEEDED
Status: DISCONTINUED | OUTPATIENT
Start: 2024-02-19 | End: 2024-02-19 | Stop reason: HOSPADM

## 2024-02-19 RX ORDER — ESMOLOL HYDROCHLORIDE 10 MG/ML
INJECTION INTRAVENOUS AS NEEDED
Status: DISCONTINUED | OUTPATIENT
Start: 2024-02-19 | End: 2024-02-19 | Stop reason: SURG

## 2024-02-19 RX ORDER — PROMETHAZINE HYDROCHLORIDE 25 MG/1
25 TABLET ORAL ONCE AS NEEDED
Status: DISCONTINUED | OUTPATIENT
Start: 2024-02-19 | End: 2024-02-19 | Stop reason: HOSPADM

## 2024-02-19 RX ORDER — SODIUM CHLORIDE 0.9 % (FLUSH) 0.9 %
3-10 SYRINGE (ML) INJECTION AS NEEDED
Status: DISCONTINUED | OUTPATIENT
Start: 2024-02-19 | End: 2024-02-19 | Stop reason: HOSPADM

## 2024-02-19 RX ORDER — SODIUM CHLORIDE 0.9 % (FLUSH) 0.9 %
3 SYRINGE (ML) INJECTION EVERY 12 HOURS SCHEDULED
Status: DISCONTINUED | OUTPATIENT
Start: 2024-02-19 | End: 2024-02-19 | Stop reason: HOSPADM

## 2024-02-19 RX ORDER — HYDROCODONE BITARTRATE AND ACETAMINOPHEN 7.5; 325 MG/1; MG/1
1 TABLET ORAL EVERY 4 HOURS PRN
Qty: 30 TABLET | Refills: 0 | Status: SHIPPED | OUTPATIENT
Start: 2024-02-19

## 2024-02-19 RX ORDER — LIDOCAINE HYDROCHLORIDE 10 MG/ML
0.5 INJECTION, SOLUTION INFILTRATION; PERINEURAL ONCE AS NEEDED
Status: DISCONTINUED | OUTPATIENT
Start: 2024-02-19 | End: 2024-02-19 | Stop reason: HOSPADM

## 2024-02-19 RX ORDER — ROCURONIUM BROMIDE 10 MG/ML
INJECTION, SOLUTION INTRAVENOUS AS NEEDED
Status: DISCONTINUED | OUTPATIENT
Start: 2024-02-19 | End: 2024-02-19 | Stop reason: SURG

## 2024-02-19 RX ORDER — ONDANSETRON 2 MG/ML
4 INJECTION INTRAMUSCULAR; INTRAVENOUS ONCE AS NEEDED
Status: DISCONTINUED | OUTPATIENT
Start: 2024-02-19 | End: 2024-02-19 | Stop reason: HOSPADM

## 2024-02-19 RX ORDER — ONDANSETRON 4 MG/1
4 TABLET, FILM COATED ORAL EVERY 8 HOURS PRN
Qty: 20 TABLET | Refills: 0 | Status: SHIPPED | OUTPATIENT
Start: 2024-02-19 | End: 2024-03-20

## 2024-02-19 RX ORDER — DEXAMETHASONE SODIUM PHOSPHATE 4 MG/ML
INJECTION, SOLUTION INTRA-ARTICULAR; INTRALESIONAL; INTRAMUSCULAR; INTRAVENOUS; SOFT TISSUE AS NEEDED
Status: DISCONTINUED | OUTPATIENT
Start: 2024-02-19 | End: 2024-02-19 | Stop reason: SURG

## 2024-02-19 RX ORDER — MAGNESIUM HYDROXIDE 1200 MG/15ML
LIQUID ORAL AS NEEDED
Status: DISCONTINUED | OUTPATIENT
Start: 2024-02-19 | End: 2024-02-19 | Stop reason: HOSPADM

## 2024-02-19 RX ADMIN — CEFAZOLIN SODIUM 2000 MG: 2 INJECTION, SOLUTION INTRAVENOUS at 13:53

## 2024-02-19 RX ADMIN — PROPOFOL 140 MG: 10 INJECTION, EMULSION INTRAVENOUS at 14:05

## 2024-02-19 RX ADMIN — SODIUM CHLORIDE, POTASSIUM CHLORIDE, SODIUM LACTATE AND CALCIUM CHLORIDE 9 ML/HR: 600; 310; 30; 20 INJECTION, SOLUTION INTRAVENOUS at 11:49

## 2024-02-19 RX ADMIN — EPHEDRINE SULFATE 10 MG: 50 INJECTION INTRAVENOUS at 14:49

## 2024-02-19 RX ADMIN — FENTANYL CITRATE 50 MCG: 50 INJECTION, SOLUTION INTRAMUSCULAR; INTRAVENOUS at 13:30

## 2024-02-19 RX ADMIN — ESMOLOL HYDROCHLORIDE 50 MG: 100 INJECTION, SOLUTION INTRAVENOUS at 14:05

## 2024-02-19 RX ADMIN — Medication 150 MCG: at 14:16

## 2024-02-19 RX ADMIN — EPHEDRINE SULFATE 10 MG: 50 INJECTION INTRAVENOUS at 14:29

## 2024-02-19 RX ADMIN — LIDOCAINE HYDROCHLORIDE 80 MG: 20 INJECTION, SOLUTION EPIDURAL; INFILTRATION; INTRACAUDAL; PERINEURAL at 14:05

## 2024-02-19 RX ADMIN — ROPIVACAINE HYDROCHLORIDE 40 ML: 5 INJECTION EPIDURAL; INFILTRATION; PERINEURAL at 13:40

## 2024-02-19 RX ADMIN — DEXAMETHASONE SODIUM PHOSPHATE 6 MG: 4 INJECTION, SOLUTION INTRA-ARTICULAR; INTRALESIONAL; INTRAMUSCULAR; INTRAVENOUS; SOFT TISSUE at 14:16

## 2024-02-19 RX ADMIN — ROCURONIUM BROMIDE 50 MG: 10 INJECTION, SOLUTION INTRAVENOUS at 14:05

## 2024-02-19 RX ADMIN — Medication 100 MCG: at 15:13

## 2024-02-19 RX ADMIN — DEXAMETHASONE SODIUM PHOSPHATE 8 MG: 4 INJECTION, SOLUTION INTRA-ARTICULAR; INTRALESIONAL; INTRAMUSCULAR; INTRAVENOUS; SOFT TISSUE at 13:40

## 2024-02-19 RX ADMIN — Medication 50 MCG: at 14:29

## 2024-02-19 RX ADMIN — SUGAMMADEX 200 MG: 100 INJECTION, SOLUTION INTRAVENOUS at 15:32

## 2024-02-19 NOTE — ANESTHESIA POSTPROCEDURE EVALUATION
Patient: Paul English    Procedure Summary       Date: 02/19/24 Room / Location: [facility] OR [address] [place] [address] [facility]    Anesthesia Start: 1400 Anesthesia Stop: 1542    Procedure: LEFT POSTERIOR TIBAL TENDON DEBRIDEMENT, FLEXOR DIGITORUM LONGUS TENDON TRANSFER, CALCANEAL/ COTTON OSTEOTOMIES, SPRING LIGAMENT REPAIR, ACHILLES LENGTHENING (Left: Ankle) Diagnosis:     Surgeons: Eric Garza Jr., MD Provider: Ethan Martinez MD    Anesthesia Type: general with block ASA Status: 3            Anesthesia Type: general with block    Vitals  Vitals Value Taken Time   /68 02/19/24 1600   Temp 36.5 °C (97.7 °F) 02/19/24 1545   Pulse 75 02/19/24 1605   Resp 16 02/19/24 1600   SpO2 92 % 02/19/24 1605   Vitals shown include unfiled device data.        Post Anesthesia Care and Evaluation    Patient location during evaluation: bedside  Patient participation: complete - patient participated  Level of consciousness: awake and alert  Pain management: adequate    Airway patency: patent  Anesthetic complications: No anesthetic complications  PONV Status: controlled  Cardiovascular status: blood pressure returned to baseline and acceptable  Respiratory status: acceptable  Hydration status: acceptable

## 2024-02-19 NOTE — OP NOTE
Procedure Note    Paul English  2/19/2024    Pre-op Diagnosis:   Left posterior tibial tendon dysfunction/tendinitis  Left foot pronation deformity  Left spring ligament rupture  Left Achilles equinus contracture       Post-Op Diagnosis Codes:  Same    Procedure:  Left flexor digitorum longus deep tendon transfer (50792)  Medial displacement calcaneal osteotomy (25595)  Left spring ligament secondary reconstruction (84890)   Left medial cuneiform cotton osteotomy (66937)  Left posterior tibial tendon debridement/tenolysis (92453)   Achilles lengthening, left, separate incision (67111)      Surgeon(s):  Eric Garza Jr., MD    Assistants:  None    Anesthesia: General with Block    Estimated Blood Loss: minimal    Specimens:                None    Drains:   None    Complications:   None apparent    Disposition:  Stable to PACU for recovery    Indications for procedure:  The patient is a very pleasant 78-year-old male who had progressive pain and dysfunction related to left posterior tibial tendon dysfunction with associated hindfoot deformity/Achilles contracture.  His symptoms were refractory to prolonged nonoperative management.  He had done quite well with a right flexible flatfoot reconstruction by another provider in 2017.  He requested surgical reconstruction.  The above listed procedures were recommended.  The risks/benefits of surgery, including pain, infection, wound healing problems, deformity recurrence, need for future procedures, mal/nonunion, DVT/PE, cardiac event, and/or death were discussed, and the patient elected to proceed with surgery.        Procedure in detail:  The correct patient was identified in preoperative holding.  All risks and benefits of surgery were again discussed in detail, and the patient agreed to proceed with surgery.  The operative extremity was confirmed and marked.  Operative consent reviewed and confirmed to be signed.    At this time, the patient was wheeled to the  operative theatre and placed supine on the OR table.  Anesthesia was induced smoothly by our anesthesia colleagues.  The left lower extremity was prepped and draped in standard sterile fashion.  Appropriate presurgical timeout was performed, confirming correct patient, correct extremity, correct procedure, availability of sterile instruments/implants, and the administration of intravenous antibiotics within one hour of skin incision.    After careful assessment of the equinus contracture by Silfverskiold testing, the Achilles  tendon is carefully lengthened in triple hemisection fashion with a #15 blade over three  well-spaced longitudinal incisions over the posterior, distal Achilles (two medial and 1  lateral-based tendon incisions made). Following these hemisection cuts, a gentle, gradual dorsiflexion force is applied to the foot, allowing Achilles lengthening and adequate dorsiflexion to the ankle. The Achilles remained intact.      A lateral-based, oblique incision is made over the calcaneal tuberosity.  Careful subcutaneous dissection carried down to the lateral periosteum, being sure to protect any branches of the sural nerve.  A self-retaining retractor is placed.  The dissection is carried out to the superior and inferior recesses of the tuberosity and Yolis retractors are placed.  A lateral fluoroscopic view is obtained to confirm the intended location of the osteotomy.  A small fan saw blade on TPS is used to perform the osteotomy perpendicular to the lateral calcaneal wall.  A broad osteotome is inserted to free up the tuberosity fragment, followed by a lamina .  The tuberosity fragment is translated about 1 cm medially and several millimeters inferiorly and guide wire for the Medline 5.5 mm headed screw is appropriately placed under lateral fluoroscopic guidance.  The guide pin position is also checked on the Aguilera axial heel view with fluoroscopy and found to be appropriate.  After measuring,  an appropriate length 5.5mm screw is placed with excellent compression/purchase.  The guide wire is removed.  Excellent correction is obtained.  An additional 5.5mm screw was placed in similar fashion for increased stability and rotational control.  The lateral calcaneal wall at the osteotomy site is gently rongeured and tamped down smooth to avoid prominence, incision irrigated and then closed with 000 Vicryl, and staples on the skin.  Staples are used to close the stab incision in the heel used for screw placement.      Attention is turned medially where an incision is made over the posterior tibial tendon course from the inferior tip of the medial malleolus to just distal to the inferior aspect of the navicular tuberosity.  The approach is carried down to the posterior tibial tendon sheath, which is incised.  There was a fair amount of inflammatory fluid and tenosynovium around the thickened tendon.  A full tenolysis/debridement of the tendon was performed and any degenerative regions excised.  Approximately 50% of the tendon remained relatively healthy with good excursion after debridement and it was left in place.     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  The foot was held in a reduced position and secondary reconstruction was performed #2 FiberWire and 2-0 FiberWire, retensioning the ligament.     The FDL tendon is then identified and exposed just inferiorly to the posterior tibial tendon sheath and the FDL sheath is opened.  Distally the abductor hallucis muscle is retracted inferiorly and master knot of Barney exposed.  The medial plantar nerve and venous plexus are protected.  The FDL tendon is harvested just proximal to this decussation and suture loop placed in the distal end for eventual transfer purposes.  The tendon is sized.    Under an AP foot fluoroscopic view a guide wire is placed medially in the navicular and confirmed to be in appropriate position.  An appropriately-sized  drill hole is made over the guide wire in the navicular tuberosity, leaving a good cortical rim of bone.      The FDL tendon is passed from plantar to dorsal through the navicular and appropriately tensioned with the foot in an inverted and plantarflexed position.  An Arthrex biocomposite biotenodesis screw is placed with excellent tendon tension and fixation through the navicular.  The excess suture tape limbs are cut.      The transferred FDL stump distally is further secured by sewing it into the remaining posterior tibial tendon stump at the navicular.      The foot was carefully examined now with the hindfoot in neutral position and revealed evidence of residual forefoot varus/supination, therefore I proceeded with a plantarflexion opening wedge medial cuneiform osteotomy (Cotton procedure).  First using intraooperative fluoroscopy for accurate localization, a 3 cm dorsal incision centered over the medial cuneiform was made and careful subcutaneous dissection carried down with tenotomy scissors in the EHL/EHB tendon interval and tendons retracted/protected.  Any cutaneous branches of the superficial peroneal nerve were carefully protected as well.      The midportion of the dorsal aspect of the medial cuneiform was identified and carefully marked with Bovie electrocautery after fluoroscopic confirmation, along with identification of the first TMT joint.  The intended osteotomy site lined up at the level of the second TMT joint on AP and lateral foot fluoroscopic views.  A small microsagittal saw was used to complete the medial cuneiform osteotomy in transverse fashion from dorsal to plantar, leaving the plantar cortex/periosteum intact.  A series of small, narrow osteotomes were used in sequential fashion to gently level the medial column down to reestablish a plantigrade foot with normal “tripod” balance.  Trials from the [id number] Cotton wedge set were then used to confirm the appropriate wedge size.  The  "appropriate wedge was tamped into position within the medial cuneiform osteotomy site, with final position confirmed by multiple fluoroscopic views.  The wedge and osteotomy were stable and deemed to not require any additional fixation dorsally.  This incision was closed in layered fashion with 000 Vicryl and staples on the skin.       The medial wound is copiously irrigated and closed with 00 Vicryl in the flexor sheath, 000 Vicryl in the subcutaneous tissue, and Nylon on the skin.      Excellent correction is now noted to the foot.  Final fluoroscopic views of the foot including AP, oblique, lateral, and Aguilera heel confirm excellent correction, alignment, and hardware placement.    The skin is cleaned and dried.  A sterile bulky dressing and standard short leg splint was applied with the ankle/foot inverted and in slight plantarflexion.  Tourniquet released and excellent capillary refill returned to all toes.  The patient was awoken from anesthesia without apparent complication and taken to PACU for recovery.    Postoperative Plan:  Weightbearing status: Non-weightbearing in the splint  DVT Prophylaxis: Patient will resume his home Xarelto postoperative day 1;  Patient will be instructed to elevate as much as possible (\"toes above the nose\") and to get up and move around at least once per hour while awake to help minimize risk of blood clots.          Eric Garza Jr, MD     Date: 2/19/2024  Time: 14:06 EST        "

## 2024-02-19 NOTE — INTERVAL H&P NOTE
H&P reviewed. The patient was examined and there are no changes to the H&P.      The risks/benefits of surgery, including pain, infection, wound healing problems, recurrent deformity, need for future procedures, mal/nonunion, DVT/PE, cardiac event, and/or death were discussed, and the patient elected to proceed with surgery.

## 2024-02-19 NOTE — ANESTHESIA PROCEDURE NOTES
Airway  Urgency: elective    Date/Time: 2/19/2024 2:08 PM  Airway not difficult    General Information and Staff    Patient location during procedure: OR  CRNA/CAA: Mahogany Burden CRNA    Indications and Patient Condition  Indications for airway management: airway protection    Preoxygenated: yes  Mask difficulty assessment: 1 - vent by mask    Final Airway Details  Final airway type: endotracheal airway      Successful airway: ETT  Cuffed: yes   Successful intubation technique: direct laryngoscopy  Endotracheal tube insertion site: oral  Blade: Owens  Blade size: 2  ETT size (mm): 7.5  Cormack-Lehane Classification: grade I - full view of glottis  Placement verified by: capnometry   Measured from: lips  ETT/EBT  to lips (cm): 23  Number of attempts at approach: 1  Assessment: lips, teeth, and gum same as pre-op and atraumatic intubation

## 2024-02-19 NOTE — ANESTHESIA PREPROCEDURE EVALUATION
Anesthesia Evaluation     Patient summary reviewed and Nursing notes reviewed   no history of anesthetic complications:   NPO Solid Status: > 8 hours  NPO Liquid Status: > 2 hours           Airway   Mallampati: II  TM distance: >3 FB  Neck ROM: full  Dental      Pulmonary    (+) ,sleep apnea  Cardiovascular     ECG reviewed    (+) hypertension, dysrhythmias Paroxysmal Atrial Fib    ROS comment: EKG normal    Neuro/Psych  GI/Hepatic/Renal/Endo    (+) renal disease- CRI, diabetes mellitus    Musculoskeletal     Abdominal    Substance History      OB/GYN          Other                      Anesthesia Plan    ASA 3     general with block     intravenous induction     Anesthetic plan, risks, benefits, and alternatives have been provided, discussed and informed consent has been obtained with: patient.      CODE STATUS:

## 2024-02-19 NOTE — ANESTHESIA PROCEDURE NOTES
Peripheral Block    Pre-sedation assessment completed: 2/19/2024 1:00 PM    Patient reassessed immediately prior to procedure    Patient location during procedure: pre-op  Start time: 2/19/2024 1:31 PM  Stop time: 2/19/2024 1:40 PM  Reason for block: at surgeon's request  Performed by  Anesthesiologist: Ethan Martinez MD  Preanesthetic Checklist  Completed: patient identified, IV checked, site marked, risks and benefits discussed, surgical consent, monitors and equipment checked, pre-op evaluation and timeout performed  Prep:  Pt Position: supine  Sterile barriers:cap, mask and washed/disinfected hands  Prep: ChloraPrep  Patient monitoring: blood pressure monitoring, EKG and continuous pulse oximetry  Procedure    Sedation: yes  Performed under: local infiltration  Guidance:ultrasound guided    ULTRASOUND INTERPRETATION.  Using ultrasound guidance a 21 G gauge needle was placed in close proximity to the femoral and sciatic nerve, at which point, under ultrasound guidance anesthetic was injected in the area of the nerve and spread of the anesthesia was seen on ultrasound in close proximity thereto.  There were no abnormalities seen on ultrasound; a digital image was taken; and the patient tolerated the procedure with no complications. Images:still images obtained, printed/placed on chart    Laterality:left  Block Type:adductor canal block and popliteal  Injection Technique:single-shot  Needle Type:echogenic  Needle Gauge:21 G  Resistance on Injection: none    Medications Used: dexamethasone (DECADRON) injection - Injection   8 mg - 2/19/2024 1:40:00 PM  ropivacaine (NAROPIN) 0.5 % injection - Injection   40 mL - 2/19/2024 1:40:00 PM      Post Assessment  Injection Assessment: negative aspiration for heme, no paresthesia on injection and incremental injection  Patient Tolerance:comfortable throughout block  Complications:no  Additional Notes  Ultrasound guidance used to visualize nerve anatomy, guide needle  placement and verify local anesthetic disbursement.

## 2025-03-19 ENCOUNTER — OFFICE VISIT (OUTPATIENT)
Dept: CARDIOLOGY | Facility: CLINIC | Age: 80
End: 2025-03-19
Payer: MEDICARE

## 2025-03-19 VITALS
BODY MASS INDEX: 24.86 KG/M2 | OXYGEN SATURATION: 97 % | HEIGHT: 73 IN | WEIGHT: 187.6 LBS | SYSTOLIC BLOOD PRESSURE: 124 MMHG | DIASTOLIC BLOOD PRESSURE: 62 MMHG | HEART RATE: 59 BPM

## 2025-03-19 DIAGNOSIS — I48.0 PAROXYSMAL ATRIAL FIBRILLATION: Primary | ICD-10-CM

## 2025-03-19 DIAGNOSIS — Z79.01 LONG TERM (CURRENT) USE OF ANTICOAGULANTS: ICD-10-CM

## 2025-03-19 DIAGNOSIS — I10 ESSENTIAL HYPERTENSION: ICD-10-CM

## 2025-03-19 NOTE — PROGRESS NOTES
"      CARDIOLOGY    Alison Mccracken MD    ENCOUNTER DATE:  03/19/2025    Paul English / 79 y.o. / male        CHIEF COMPLAINT / REASON FOR OFFICE VISIT     Follow-up      HISTORY OF PRESENT ILLNESS       HPI    Paul English is a 79 y.o. male     This is a patient who previously followed with Dr. Stone.  He has paroxysmal atrial fibrillation.  He had an echo in March 2018 which showed a sigmoid septum but otherwise normal LV function and no significant valve disease.  He was seen in the emergency room on August 2 with an episode of palpitations and diaphoresis.  Original EKG showed atrial fibrillation with rapid ventricular response. Repeat EKG showed sinus bradycardia.  Echocardiogram in November 2022 showed an ejection fraction of 58% with mild to moderate left ventricular hypertrophy, atrial fibrillation of a heart rate of 130.  48-hour Holter monitor November 2022 showed only sinus rhythm.     He is feeling well.  He has been staying very active.  He is not having palpitations, chest pain or shortness of breath.  His wife says that he is eating at [employer] every day.    VITAL SIGNS     Visit Vitals  /62   Pulse 59   Ht 185.4 cm (73\")   Wt 85.1 kg (187 lb 9.6 oz)   SpO2 97%   BMI 24.75 kg/m²         Wt Readings from Last 3 Encounters:   03/19/25 85.1 kg (187 lb 9.6 oz)   02/12/24 83.5 kg (184 lb)   01/03/24 83.6 kg (184 lb 6.4 oz)     Body mass index is 24.75 kg/m².      PHYSICAL EXAMINATION     Constitutional:       General: Not in acute distress.  Neck:      Vascular: No carotid bruit or JVD.   Pulmonary:      Effort: Pulmonary effort is normal.      Breath sounds: Normal breath sounds.   Cardiovascular:      Normal rate. Regular rhythm.      Murmurs: There is no murmur.   Psychiatric:         Mood and Affect: Mood and affect normal.           REVIEWED DATA       ECG 12 Lead    Date/Time: 3/19/2025 2:26 PM  Performed by: Alison Mccracken MD    Authorized by: Alison Mccracken MD  " Comparison: compared with previous ECG from 1/3/2024  Similar to previous ECG  Rhythm: sinus rhythm  BPM: 59  Conduction: conduction normal  ST Segments: ST segments normal  T Waves: T waves normal    Clinical impression: normal ECG                Lab Results   Component Value Date    GLUCOSE 247 (H) 02/12/2024    BUN 28 (H) 02/12/2024    CREATININE 1.54 (H) 02/12/2024     02/12/2024    K 4.4 02/12/2024     (H) 02/12/2024    CALCIUM 9.9 02/12/2024    PROTEINTOT 6.1 08/02/2022    ALBUMIN 4.20 08/02/2022    ALT 19 08/02/2022    AST 16 08/02/2022    ALKPHOS 40 08/02/2022    BILITOT 0.5 08/02/2022    GLOB 1.9 08/02/2022    AGRATIO 2.2 08/02/2022    BCR 18.2 02/12/2024    ANIONGAP 11.9 02/12/2024    EGFR 45.6 (L) 02/12/2024       ASSESSMENT & PLAN      Diagnosis Plan   1. Paroxysmal atrial fibrillation        2. Essential hypertension        3. Long term (current) use of anticoagulants              1. Paroxysmal atrial fibrillation.  He is anticoagulated with Xarelto.  He is not having any bleeding issues.  He is in sinus rhythm today.  He is not having any palpitations.  2. Hypertension. Controlled.   3.  Preoperative evaluation prior to skin removal from his face.  This is for squamous cell cancer.  He may hold Xarelto for 3 days prior to the procedure.  No further testing needed.    Follow-up with Tricia in 1 year.    Obtain labs from his PCP.      Orders Placed This Encounter   Procedures    ECG 12 Lead     This order was created via procedure documentation     Release to patient:   Routine Release [[id number]]           MEDICATIONS         Discharge Medications            Accurate as of March 19, 2025  2:28 PM. If you have any questions, ask your nurse or doctor.                Continue These Medications        Instructions Start Date   Accu-Chek Guide test strip  Generic drug: glucose blood       Accu-Chek Guide w/Device kit       Accu-Chek Softclix Lancets lancets       Farxiga 10 MG tablet  Generic  drug: dapagliflozin Propanediol   Take 10 mg by mouth Every Morning.      glipizide 10 MG tablet  Commonly known as: GLUCOTROL   10 mg, 2 Times Daily Before Meals      KRILL OIL PO   600 mg, Daily      metoprolol succinate XL 25 MG 24 hr tablet  Commonly known as: TOPROL-XL   25 mg, Oral, 2 Times Daily      PRESERVISION AREDS 2 PO   1 tablet, Every Evening      rivaroxaban 15 MG tablet  Commonly known as: XARELTO   15 mg, Oral, Daily      rosuvastatin 5 MG tablet  Commonly known as: CRESTOR   5 mg, Nightly      tamsulosin 0.4 MG capsule 24 hr capsule  Commonly known as: FLOMAX   1 capsule, Every Night at Bedtime      Vitamin D3 50 MCG (2000 UT) capsule   2,000 Units, Daily                 Alison Mccracken MD  03/19/25  14:28 EDT    Part of this note may be an electronic transcription/translation of spoken language to printed text using the Dragon dictation system.